# Patient Record
Sex: FEMALE | Race: WHITE | HISPANIC OR LATINO | Employment: FULL TIME | URBAN - METROPOLITAN AREA
[De-identification: names, ages, dates, MRNs, and addresses within clinical notes are randomized per-mention and may not be internally consistent; named-entity substitution may affect disease eponyms.]

---

## 2024-06-05 ENCOUNTER — HOSPITAL ENCOUNTER (OUTPATIENT)
Facility: HOSPITAL | Age: 49
Setting detail: OBSERVATION
Discharge: HOME | End: 2024-06-06
Attending: EMERGENCY MEDICINE | Admitting: HOSPITALIST
Payer: COMMERCIAL

## 2024-06-05 ENCOUNTER — APPOINTMENT (OUTPATIENT)
Dept: CT IMAGING | Facility: HOSPITAL | Age: 49
End: 2024-06-05
Payer: COMMERCIAL

## 2024-06-05 ENCOUNTER — APPOINTMENT (OUTPATIENT)
Dept: GENERAL RADIOLOGY | Facility: HOSPITAL | Age: 49
End: 2024-06-05
Payer: COMMERCIAL

## 2024-06-05 DIAGNOSIS — R11.2 NAUSEA AND VOMITING, UNSPECIFIED VOMITING TYPE: Primary | ICD-10-CM

## 2024-06-05 DIAGNOSIS — E87.29 HIGH ANION GAP METABOLIC ACIDOSIS: ICD-10-CM

## 2024-06-05 DIAGNOSIS — D72.829 LEUKOCYTOSIS, UNSPECIFIED TYPE: ICD-10-CM

## 2024-06-05 PROBLEM — R11.10 VOMITING: Status: ACTIVE | Noted: 2024-06-05

## 2024-06-05 PROBLEM — E10.9 TYPE 1 DIABETES MELLITUS: Status: ACTIVE | Noted: 2024-06-05

## 2024-06-05 LAB
ALBUMIN SERPL-MCNC: 2.9 G/DL (ref 3.5–5.2)
ALBUMIN SERPL-MCNC: 3.7 G/DL (ref 3.5–5.2)
ALBUMIN/GLOB SERPL: 1.5 G/DL
ALBUMIN/GLOB SERPL: 1.5 G/DL
ALP SERPL-CCNC: 57 U/L (ref 39–117)
ALP SERPL-CCNC: 73 U/L (ref 39–117)
ALT SERPL W P-5'-P-CCNC: 20 U/L (ref 1–33)
ALT SERPL W P-5'-P-CCNC: 21 U/L (ref 1–33)
AMPHET+METHAMPHET UR QL: NEGATIVE
ANION GAP SERPL CALCULATED.3IONS-SCNC: 13.3 MMOL/L (ref 5–15)
ANION GAP SERPL CALCULATED.3IONS-SCNC: 15.4 MMOL/L (ref 5–15)
ANION GAP SERPL CALCULATED.3IONS-SCNC: 16 MMOL/L (ref 5–15)
ANION GAP SERPL CALCULATED.3IONS-SCNC: 17 MMOL/L (ref 5–15)
ANION GAP SERPL CALCULATED.3IONS-SCNC: 17.9 MMOL/L (ref 5–15)
ANION GAP SERPL CALCULATED.3IONS-SCNC: 19.9 MMOL/L (ref 5–15)
AST SERPL-CCNC: 18 U/L (ref 1–32)
AST SERPL-CCNC: 21 U/L (ref 1–32)
ATMOSPHERIC PRESS: 743 MMHG
B-OH-BUTYR SERPL-SCNC: 3.73 MMOL/L (ref 0.02–0.27)
BARBITURATES UR QL SCN: NEGATIVE
BASE EXCESS BLDV CALC-SCNC: -13.6 MMOL/L (ref -2–2)
BASOPHILS # BLD AUTO: 0.05 10*3/MM3 (ref 0–0.2)
BASOPHILS # BLD AUTO: 0.07 10*3/MM3 (ref 0–0.2)
BASOPHILS NFR BLD AUTO: 0.2 % (ref 0–1.5)
BASOPHILS NFR BLD AUTO: 0.2 % (ref 0–1.5)
BENZODIAZ UR QL SCN: NEGATIVE
BILIRUB SERPL-MCNC: 0.3 MG/DL (ref 0–1.2)
BILIRUB SERPL-MCNC: 0.4 MG/DL (ref 0–1.2)
BILIRUB UR QL STRIP: NEGATIVE
BUN SERPL-MCNC: 11 MG/DL (ref 6–20)
BUN SERPL-MCNC: 6 MG/DL (ref 6–20)
BUN SERPL-MCNC: 8 MG/DL (ref 6–20)
BUN/CREAT SERPL: 10.4 (ref 7–25)
BUN/CREAT SERPL: 10.7 (ref 7–25)
BUN/CREAT SERPL: 11 (ref 7–25)
BUN/CREAT SERPL: 12.1 (ref 7–25)
BUN/CREAT SERPL: 15.7 (ref 7–25)
BUN/CREAT SERPL: 7.7 (ref 7–25)
CALCIUM SPEC-SCNC: 5.7 MG/DL (ref 8.6–10.5)
CALCIUM SPEC-SCNC: 7.4 MG/DL (ref 8.6–10.5)
CALCIUM SPEC-SCNC: 8 MG/DL (ref 8.6–10.5)
CALCIUM SPEC-SCNC: 8.2 MG/DL (ref 8.6–10.5)
CALCIUM SPEC-SCNC: 8.2 MG/DL (ref 8.6–10.5)
CALCIUM SPEC-SCNC: 8.8 MG/DL (ref 8.6–10.5)
CANNABINOIDS SERPL QL: NEGATIVE
CHLORIDE SERPL-SCNC: 106 MMOL/L (ref 98–107)
CHLORIDE SERPL-SCNC: 107 MMOL/L (ref 98–107)
CHLORIDE SERPL-SCNC: 109 MMOL/L (ref 98–107)
CHLORIDE SERPL-SCNC: 121 MMOL/L (ref 98–107)
CLARITY UR: CLEAR
CO2 BLDA-SCNC: 14.1 MMOL/L (ref 23–27)
CO2 SERPL-SCNC: 11.1 MMOL/L (ref 22–29)
CO2 SERPL-SCNC: 11.7 MMOL/L (ref 22–29)
CO2 SERPL-SCNC: 12 MMOL/L (ref 22–29)
CO2 SERPL-SCNC: 12.1 MMOL/L (ref 22–29)
CO2 SERPL-SCNC: 16 MMOL/L (ref 22–29)
CO2 SERPL-SCNC: 7.6 MMOL/L (ref 22–29)
COCAINE UR QL: NEGATIVE
COLOR UR: YELLOW
CREAT SERPL-MCNC: 0.51 MG/DL (ref 0.57–1)
CREAT SERPL-MCNC: 0.73 MG/DL (ref 0.57–1)
CREAT SERPL-MCNC: 0.75 MG/DL (ref 0.57–1)
CREAT SERPL-MCNC: 0.77 MG/DL (ref 0.57–1)
CREAT SERPL-MCNC: 0.78 MG/DL (ref 0.57–1)
CREAT SERPL-MCNC: 0.91 MG/DL (ref 0.57–1)
DEPRECATED RDW RBC AUTO: 45.5 FL (ref 37–54)
DEPRECATED RDW RBC AUTO: 46.4 FL (ref 37–54)
DEVICE COMMENT: ABNORMAL
EGFRCR SERPLBLD CKD-EPI 2021: 101.6 ML/MIN/1.73
EGFRCR SERPLBLD CKD-EPI 2021: 115.3 ML/MIN/1.73
EGFRCR SERPLBLD CKD-EPI 2021: 78 ML/MIN/1.73
EGFRCR SERPLBLD CKD-EPI 2021: 93.8 ML/MIN/1.73
EGFRCR SERPLBLD CKD-EPI 2021: 95.3 ML/MIN/1.73
EGFRCR SERPLBLD CKD-EPI 2021: 98.3 ML/MIN/1.73
EOSINOPHIL # BLD AUTO: 0 10*3/MM3 (ref 0–0.4)
EOSINOPHIL # BLD AUTO: 0 10*3/MM3 (ref 0–0.4)
EOSINOPHIL NFR BLD AUTO: 0 % (ref 0.3–6.2)
EOSINOPHIL NFR BLD AUTO: 0 % (ref 0.3–6.2)
ERYTHROCYTE [DISTWIDTH] IN BLOOD BY AUTOMATED COUNT: 12.6 % (ref 12.3–15.4)
ERYTHROCYTE [DISTWIDTH] IN BLOOD BY AUTOMATED COUNT: 12.9 % (ref 12.3–15.4)
FENTANYL UR-MCNC: NEGATIVE NG/ML
GEN 5 2HR TROPONIN T REFLEX: 8 NG/L
GLOBULIN UR ELPH-MCNC: 2 GM/DL
GLOBULIN UR ELPH-MCNC: 2.5 GM/DL
GLUCOSE BLDC GLUCOMTR-MCNC: 148 MG/DL (ref 70–130)
GLUCOSE BLDC GLUCOMTR-MCNC: 159 MG/DL (ref 70–130)
GLUCOSE BLDC GLUCOMTR-MCNC: 160 MG/DL (ref 70–130)
GLUCOSE BLDC GLUCOMTR-MCNC: 172 MG/DL (ref 70–130)
GLUCOSE BLDC GLUCOMTR-MCNC: 178 MG/DL (ref 70–130)
GLUCOSE BLDC GLUCOMTR-MCNC: 180 MG/DL (ref 70–130)
GLUCOSE BLDC GLUCOMTR-MCNC: 188 MG/DL (ref 70–130)
GLUCOSE BLDC GLUCOMTR-MCNC: 191 MG/DL (ref 70–130)
GLUCOSE BLDC GLUCOMTR-MCNC: 196 MG/DL (ref 70–130)
GLUCOSE SERPL-MCNC: 152 MG/DL (ref 65–99)
GLUCOSE SERPL-MCNC: 181 MG/DL (ref 65–99)
GLUCOSE SERPL-MCNC: 188 MG/DL (ref 65–99)
GLUCOSE SERPL-MCNC: 188 MG/DL (ref 65–99)
GLUCOSE SERPL-MCNC: 208 MG/DL (ref 65–99)
GLUCOSE SERPL-MCNC: 214 MG/DL (ref 65–99)
GLUCOSE UR STRIP-MCNC: ABNORMAL MG/DL
HBA1C MFR BLD: 7 % (ref 4.8–5.6)
HCG SERPL QL: NEGATIVE
HCO3 BLDV-SCNC: 13.1 MMOL/L (ref 22–28)
HCT VFR BLD AUTO: 42 % (ref 34–46.6)
HCT VFR BLD AUTO: 45.5 % (ref 34–46.6)
HCT VFR BLDA CALC: 45 % (ref 38–51)
HGB BLD-MCNC: 13.3 G/DL (ref 12–15.9)
HGB BLD-MCNC: 14.5 G/DL (ref 12–15.9)
HGB BLDA-MCNC: 15.5 G/DL (ref 12–17)
HGB UR QL STRIP.AUTO: NEGATIVE
HOLD SPECIMEN: NORMAL
IMM GRANULOCYTES # BLD AUTO: 0.14 10*3/MM3 (ref 0–0.05)
IMM GRANULOCYTES # BLD AUTO: 0.26 10*3/MM3 (ref 0–0.05)
IMM GRANULOCYTES NFR BLD AUTO: 0.6 % (ref 0–0.5)
IMM GRANULOCYTES NFR BLD AUTO: 0.9 % (ref 0–0.5)
KETONES UR QL STRIP: ABNORMAL
LEUKOCYTE ESTERASE UR QL STRIP.AUTO: NEGATIVE
LYMPHOCYTES # BLD AUTO: 0.6 10*3/MM3 (ref 0.7–3.1)
LYMPHOCYTES # BLD AUTO: 2.17 10*3/MM3 (ref 0.7–3.1)
LYMPHOCYTES NFR BLD AUTO: 2.1 % (ref 19.6–45.3)
LYMPHOCYTES NFR BLD AUTO: 8.8 % (ref 19.6–45.3)
MAGNESIUM SERPL-MCNC: 1.7 MG/DL (ref 1.6–2.6)
MAGNESIUM SERPL-MCNC: 1.8 MG/DL (ref 1.6–2.6)
MAGNESIUM SERPL-MCNC: 1.8 MG/DL (ref 1.6–2.6)
MAGNESIUM SERPL-MCNC: 2.2 MG/DL (ref 1.6–2.6)
MCH RBC QN AUTO: 31.1 PG (ref 26.6–33)
MCH RBC QN AUTO: 31.3 PG (ref 26.6–33)
MCHC RBC AUTO-ENTMCNC: 31.7 G/DL (ref 31.5–35.7)
MCHC RBC AUTO-ENTMCNC: 31.9 G/DL (ref 31.5–35.7)
MCV RBC AUTO: 98.1 FL (ref 79–97)
MCV RBC AUTO: 98.4 FL (ref 79–97)
METHADONE UR QL SCN: NEGATIVE
MODALITY: ABNORMAL
MONOCYTES # BLD AUTO: 0.48 10*3/MM3 (ref 0.1–0.9)
MONOCYTES # BLD AUTO: 1.08 10*3/MM3 (ref 0.1–0.9)
MONOCYTES NFR BLD AUTO: 1.7 % (ref 5–12)
MONOCYTES NFR BLD AUTO: 4.4 % (ref 5–12)
NEUTROPHILS NFR BLD AUTO: 21.32 10*3/MM3 (ref 1.7–7)
NEUTROPHILS NFR BLD AUTO: 26.96 10*3/MM3 (ref 1.7–7)
NEUTROPHILS NFR BLD AUTO: 86 % (ref 42.7–76)
NEUTROPHILS NFR BLD AUTO: 95.1 % (ref 42.7–76)
NITRITE UR QL STRIP: NEGATIVE
NOTIFIED WHO: ABNORMAL
NRBC BLD AUTO-RTO: 0 /100 WBC (ref 0–0.2)
NRBC BLD AUTO-RTO: 0 /100 WBC (ref 0–0.2)
OPIATES UR QL: NEGATIVE
OSMOLALITY SERPL: 295 MOSM/KG (ref 275–300)
OXYCODONE UR QL SCN: NEGATIVE
PCO2 BLDV: 32.6 MM HG (ref 41–51)
PH BLDV: 7.21 PH UNITS (ref 7.31–7.41)
PH UR STRIP.AUTO: <=5 [PH] (ref 5–8)
PHOSPHATE SERPL-MCNC: 1.4 MG/DL (ref 2.5–4.5)
PHOSPHATE SERPL-MCNC: 2.5 MG/DL (ref 2.5–4.5)
PHOSPHATE SERPL-MCNC: 2.6 MG/DL (ref 2.5–4.5)
PLATELET # BLD AUTO: 314 10*3/MM3 (ref 140–450)
PLATELET # BLD AUTO: 355 10*3/MM3 (ref 140–450)
PMV BLD AUTO: 9.3 FL (ref 6–12)
PMV BLD AUTO: 9.4 FL (ref 6–12)
PO2 BLDV: 36.5 MM HG (ref 35–45)
POTASSIUM SERPL-SCNC: 3.1 MMOL/L (ref 3.5–5.2)
POTASSIUM SERPL-SCNC: 4 MMOL/L (ref 3.5–5.2)
POTASSIUM SERPL-SCNC: 4.1 MMOL/L (ref 3.5–5.2)
POTASSIUM SERPL-SCNC: 4.6 MMOL/L (ref 3.5–5.2)
PROT SERPL-MCNC: 4.9 G/DL (ref 6–8.5)
PROT SERPL-MCNC: 6.2 G/DL (ref 6–8.5)
PROT UR QL STRIP: ABNORMAL
QT INTERVAL: 349 MS
QTC INTERVAL: 444 MS
RBC # BLD AUTO: 4.27 10*6/MM3 (ref 3.77–5.28)
RBC # BLD AUTO: 4.64 10*6/MM3 (ref 3.77–5.28)
SAO2 % BLDCOV: 58.9 % (ref 45–75)
SODIUM SERPL-SCNC: 134 MMOL/L (ref 136–145)
SODIUM SERPL-SCNC: 136 MMOL/L (ref 136–145)
SODIUM SERPL-SCNC: 137 MMOL/L (ref 136–145)
SODIUM SERPL-SCNC: 138 MMOL/L (ref 136–145)
SODIUM SERPL-SCNC: 138 MMOL/L (ref 136–145)
SODIUM SERPL-SCNC: 144 MMOL/L (ref 136–145)
SP GR UR STRIP: >1.03 (ref 1–1.03)
TROPONIN T DELTA: 2 NG/L
TROPONIN T SERPL HS-MCNC: 6 NG/L
UROBILINOGEN UR QL STRIP: ABNORMAL
WBC NRBC COR # BLD AUTO: 24.76 10*3/MM3 (ref 3.4–10.8)
WBC NRBC COR # BLD AUTO: 28.37 10*3/MM3 (ref 3.4–10.8)
WHOLE BLOOD HOLD COAG: NORMAL
WHOLE BLOOD HOLD SPECIMEN: NORMAL

## 2024-06-05 PROCEDURE — 93010 ELECTROCARDIOGRAM REPORT: CPT | Performed by: INTERNAL MEDICINE

## 2024-06-05 PROCEDURE — 99285 EMERGENCY DEPT VISIT HI MDM: CPT

## 2024-06-05 PROCEDURE — 80307 DRUG TEST PRSMV CHEM ANLYZR: CPT | Performed by: EMERGENCY MEDICINE

## 2024-06-05 PROCEDURE — 83735 ASSAY OF MAGNESIUM: CPT | Performed by: INTERNAL MEDICINE

## 2024-06-05 PROCEDURE — 85025 COMPLETE CBC W/AUTO DIFF WBC: CPT | Performed by: NURSE PRACTITIONER

## 2024-06-05 PROCEDURE — 96366 THER/PROPH/DIAG IV INF ADDON: CPT

## 2024-06-05 PROCEDURE — 25010000002 ONDANSETRON PER 1 MG: Performed by: NURSE PRACTITIONER

## 2024-06-05 PROCEDURE — 36415 COLL VENOUS BLD VENIPUNCTURE: CPT | Performed by: HOSPITALIST

## 2024-06-05 PROCEDURE — G0378 HOSPITAL OBSERVATION PER HR: HCPCS

## 2024-06-05 PROCEDURE — 96365 THER/PROPH/DIAG IV INF INIT: CPT

## 2024-06-05 PROCEDURE — 83036 HEMOGLOBIN GLYCOSYLATED A1C: CPT | Performed by: INTERNAL MEDICINE

## 2024-06-05 PROCEDURE — 81003 URINALYSIS AUTO W/O SCOPE: CPT | Performed by: NURSE PRACTITIONER

## 2024-06-05 PROCEDURE — 82010 KETONE BODYS QUAN: CPT | Performed by: NURSE PRACTITIONER

## 2024-06-05 PROCEDURE — 84703 CHORIONIC GONADOTROPIN ASSAY: CPT | Performed by: NURSE PRACTITIONER

## 2024-06-05 PROCEDURE — 74177 CT ABD & PELVIS W/CONTRAST: CPT

## 2024-06-05 PROCEDURE — 83735 ASSAY OF MAGNESIUM: CPT | Performed by: HOSPITALIST

## 2024-06-05 PROCEDURE — 25810000003 SODIUM CHLORIDE 0.9 % SOLUTION: Performed by: NURSE PRACTITIONER

## 2024-06-05 PROCEDURE — 85018 HEMOGLOBIN: CPT

## 2024-06-05 PROCEDURE — 82803 BLOOD GASES ANY COMBINATION: CPT

## 2024-06-05 PROCEDURE — 96376 TX/PRO/DX INJ SAME DRUG ADON: CPT

## 2024-06-05 PROCEDURE — 84484 ASSAY OF TROPONIN QUANT: CPT | Performed by: INTERNAL MEDICINE

## 2024-06-05 PROCEDURE — 25810000003 LACTATED RINGERS SOLUTION: Performed by: EMERGENCY MEDICINE

## 2024-06-05 PROCEDURE — 82948 REAGENT STRIP/BLOOD GLUCOSE: CPT

## 2024-06-05 PROCEDURE — 25510000001 IOPAMIDOL 61 % SOLUTION: Performed by: EMERGENCY MEDICINE

## 2024-06-05 PROCEDURE — 85025 COMPLETE CBC W/AUTO DIFF WBC: CPT | Performed by: INTERNAL MEDICINE

## 2024-06-05 PROCEDURE — 25010000002 ONDANSETRON PER 1 MG: Performed by: HOSPITALIST

## 2024-06-05 PROCEDURE — 93005 ELECTROCARDIOGRAM TRACING: CPT | Performed by: EMERGENCY MEDICINE

## 2024-06-05 PROCEDURE — 80053 COMPREHEN METABOLIC PANEL: CPT | Performed by: EMERGENCY MEDICINE

## 2024-06-05 PROCEDURE — 96375 TX/PRO/DX INJ NEW DRUG ADDON: CPT

## 2024-06-05 PROCEDURE — 84100 ASSAY OF PHOSPHORUS: CPT | Performed by: INTERNAL MEDICINE

## 2024-06-05 PROCEDURE — 83930 ASSAY OF BLOOD OSMOLALITY: CPT | Performed by: INTERNAL MEDICINE

## 2024-06-05 PROCEDURE — 71045 X-RAY EXAM CHEST 1 VIEW: CPT

## 2024-06-05 RX ORDER — SODIUM CHLORIDE 9 MG/ML
250 INJECTION, SOLUTION INTRAVENOUS CONTINUOUS PRN
Status: DISCONTINUED | OUTPATIENT
Start: 2024-06-05 | End: 2024-06-06 | Stop reason: HOSPADM

## 2024-06-05 RX ORDER — SODIUM CHLORIDE 9 MG/ML
150 INJECTION, SOLUTION INTRAVENOUS CONTINUOUS
Status: DISCONTINUED | OUTPATIENT
Start: 2024-06-05 | End: 2024-06-05

## 2024-06-05 RX ORDER — INSULIN LISPRO 100 [IU]/ML
2-9 INJECTION, SOLUTION INTRAVENOUS; SUBCUTANEOUS
Status: DISCONTINUED | OUTPATIENT
Start: 2024-06-05 | End: 2024-06-05

## 2024-06-05 RX ORDER — SODIUM CHLORIDE 9 MG/ML
40 INJECTION, SOLUTION INTRAVENOUS AS NEEDED
Status: DISCONTINUED | OUTPATIENT
Start: 2024-06-05 | End: 2024-06-06 | Stop reason: HOSPADM

## 2024-06-05 RX ORDER — NITROFURANTOIN 25; 75 MG/1; MG/1
CAPSULE ORAL
Status: ON HOLD | COMMUNITY
Start: 2024-03-26 | End: 2024-06-05

## 2024-06-05 RX ORDER — SODIUM CHLORIDE 0.9 % (FLUSH) 0.9 %
10 SYRINGE (ML) INJECTION AS NEEDED
Status: DISCONTINUED | OUTPATIENT
Start: 2024-06-05 | End: 2024-06-06 | Stop reason: HOSPADM

## 2024-06-05 RX ORDER — SODIUM CHLORIDE 0.9 % (FLUSH) 0.9 %
10 SYRINGE (ML) INJECTION EVERY 12 HOURS SCHEDULED
Status: DISCONTINUED | OUTPATIENT
Start: 2024-06-05 | End: 2024-06-06 | Stop reason: HOSPADM

## 2024-06-05 RX ORDER — SODIUM CHLORIDE AND POTASSIUM CHLORIDE 300; 900 MG/100ML; MG/100ML
250 INJECTION, SOLUTION INTRAVENOUS CONTINUOUS PRN
Status: DISCONTINUED | OUTPATIENT
Start: 2024-06-05 | End: 2024-06-06

## 2024-06-05 RX ORDER — DEXTROSE MONOHYDRATE, SODIUM CHLORIDE, AND POTASSIUM CHLORIDE 50; 1.49; 9 G/1000ML; G/1000ML; G/1000ML
150 INJECTION, SOLUTION INTRAVENOUS CONTINUOUS PRN
Status: DISCONTINUED | OUTPATIENT
Start: 2024-06-05 | End: 2024-06-06

## 2024-06-05 RX ORDER — DEXTROSE MONOHYDRATE AND SODIUM CHLORIDE 5; .9 G/100ML; G/100ML
150 INJECTION, SOLUTION INTRAVENOUS CONTINUOUS PRN
Status: DISCONTINUED | OUTPATIENT
Start: 2024-06-05 | End: 2024-06-06

## 2024-06-05 RX ORDER — DEXTROSE MONOHYDRATE, SODIUM CHLORIDE, AND POTASSIUM CHLORIDE 50; 1.49; 4.5 G/1000ML; G/1000ML; G/1000ML
150 INJECTION, SOLUTION INTRAVENOUS CONTINUOUS PRN
Status: DISCONTINUED | OUTPATIENT
Start: 2024-06-05 | End: 2024-06-06

## 2024-06-05 RX ORDER — COLESEVELAM 180 1/1
1875 TABLET ORAL
COMMUNITY
Start: 2024-05-21 | End: 2025-05-21

## 2024-06-05 RX ORDER — IBUPROFEN 600 MG/1
1 TABLET ORAL
Status: DISCONTINUED | OUTPATIENT
Start: 2024-06-05 | End: 2024-06-06 | Stop reason: HOSPADM

## 2024-06-05 RX ORDER — ONDANSETRON 2 MG/ML
4 INJECTION INTRAMUSCULAR; INTRAVENOUS EVERY 6 HOURS PRN
Status: DISCONTINUED | OUTPATIENT
Start: 2024-06-05 | End: 2024-06-06 | Stop reason: HOSPADM

## 2024-06-05 RX ORDER — INSULIN ASPART 100 [IU]/ML
INJECTION, SOLUTION INTRAVENOUS; SUBCUTANEOUS
COMMUNITY
Start: 2024-05-06

## 2024-06-05 RX ORDER — METRONIDAZOLE 500 MG/1
TABLET ORAL
Status: ON HOLD | COMMUNITY
Start: 2024-05-14 | End: 2024-06-05

## 2024-06-05 RX ORDER — NICOTINE POLACRILEX 4 MG
15 LOZENGE BUCCAL
Status: DISCONTINUED | OUTPATIENT
Start: 2024-06-05 | End: 2024-06-06 | Stop reason: HOSPADM

## 2024-06-05 RX ORDER — ACETAMINOPHEN 325 MG/1
650 TABLET ORAL EVERY 4 HOURS PRN
Status: DISCONTINUED | OUTPATIENT
Start: 2024-06-05 | End: 2024-06-06 | Stop reason: HOSPADM

## 2024-06-05 RX ORDER — DEXTROSE MONOHYDRATE 25 G/50ML
25 INJECTION, SOLUTION INTRAVENOUS
Status: DISCONTINUED | OUTPATIENT
Start: 2024-06-05 | End: 2024-06-06 | Stop reason: HOSPADM

## 2024-06-05 RX ORDER — SODIUM CHLORIDE 450 MG/100ML
250 INJECTION, SOLUTION INTRAVENOUS CONTINUOUS PRN
Status: DISCONTINUED | OUTPATIENT
Start: 2024-06-05 | End: 2024-06-06

## 2024-06-05 RX ORDER — SULFAMETHOXAZOLE AND TRIMETHOPRIM 800; 160 MG/1; MG/1
TABLET ORAL
Status: ON HOLD | COMMUNITY
Start: 2024-04-18 | End: 2024-06-05

## 2024-06-05 RX ORDER — DEXTROSE MONOHYDRATE 25 G/50ML
10-50 INJECTION, SOLUTION INTRAVENOUS
Status: DISCONTINUED | OUTPATIENT
Start: 2024-06-05 | End: 2024-06-06 | Stop reason: HOSPADM

## 2024-06-05 RX ORDER — CETIRIZINE HYDROCHLORIDE 10 MG/1
10 TABLET ORAL NIGHTLY
Status: DISCONTINUED | OUTPATIENT
Start: 2024-06-05 | End: 2024-06-06 | Stop reason: HOSPADM

## 2024-06-05 RX ORDER — ONDANSETRON 4 MG/1
4 TABLET, ORALLY DISINTEGRATING ORAL EVERY 6 HOURS PRN
Status: DISCONTINUED | OUTPATIENT
Start: 2024-06-05 | End: 2024-06-06 | Stop reason: HOSPADM

## 2024-06-05 RX ORDER — SODIUM CHLORIDE AND POTASSIUM CHLORIDE 150; 450 MG/100ML; MG/100ML
250 INJECTION, SOLUTION INTRAVENOUS CONTINUOUS PRN
Status: DISCONTINUED | OUTPATIENT
Start: 2024-06-05 | End: 2024-06-06

## 2024-06-05 RX ORDER — CALCIUM GLUCONATE 20 MG/ML
1000 INJECTION, SOLUTION INTRAVENOUS ONCE
Status: DISCONTINUED | OUTPATIENT
Start: 2024-06-05 | End: 2024-06-05

## 2024-06-05 RX ORDER — ONDANSETRON 2 MG/ML
4 INJECTION INTRAMUSCULAR; INTRAVENOUS ONCE
Status: COMPLETED | OUTPATIENT
Start: 2024-06-05 | End: 2024-06-05

## 2024-06-05 RX ORDER — MONTELUKAST SODIUM 10 MG/1
10 TABLET ORAL NIGHTLY
Status: DISCONTINUED | OUTPATIENT
Start: 2024-06-05 | End: 2024-06-06 | Stop reason: HOSPADM

## 2024-06-05 RX ORDER — CETIRIZINE HYDROCHLORIDE 10 MG/1
10 TABLET ORAL NIGHTLY
COMMUNITY

## 2024-06-05 RX ORDER — MONTELUKAST SODIUM 10 MG/1
10 TABLET ORAL NIGHTLY
COMMUNITY

## 2024-06-05 RX ORDER — OLMESARTAN MEDOXOMIL AND HYDROCHLOROTHIAZIDE 40/12.5 40; 12.5 MG/1; MG/1
TABLET ORAL
Status: ON HOLD | COMMUNITY
Start: 2024-04-24 | End: 2024-06-05

## 2024-06-05 RX ORDER — SODIUM CHLORIDE AND POTASSIUM CHLORIDE 150; 900 MG/100ML; MG/100ML
250 INJECTION, SOLUTION INTRAVENOUS CONTINUOUS PRN
Status: DISCONTINUED | OUTPATIENT
Start: 2024-06-05 | End: 2024-06-06

## 2024-06-05 RX ORDER — FLUCONAZOLE 150 MG/1
TABLET ORAL
Status: ON HOLD | COMMUNITY
Start: 2024-05-15 | End: 2024-06-05

## 2024-06-05 RX ORDER — SEMAGLUTIDE 1.34 MG/ML
INJECTION, SOLUTION SUBCUTANEOUS
COMMUNITY
Start: 2024-04-23 | End: 2024-06-06 | Stop reason: HOSPADM

## 2024-06-05 RX ORDER — DEXTROSE MONOHYDRATE, SODIUM CHLORIDE, AND POTASSIUM CHLORIDE 50; 2.98; 9 G/1000ML; G/1000ML; G/1000ML
150 INJECTION, SOLUTION INTRAVENOUS CONTINUOUS PRN
Status: DISCONTINUED | OUTPATIENT
Start: 2024-06-05 | End: 2024-06-06

## 2024-06-05 RX ORDER — OLMESARTAN MEDOXOMIL AND HYDROCHLOROTHIAZIDE 20/12.5 20; 12.5 MG/1; MG/1
1 TABLET ORAL DAILY
COMMUNITY
Start: 2024-05-21

## 2024-06-05 RX ORDER — DEXTROSE MONOHYDRATE AND SODIUM CHLORIDE 5; .45 G/100ML; G/100ML
150 INJECTION, SOLUTION INTRAVENOUS CONTINUOUS PRN
Status: DISCONTINUED | OUTPATIENT
Start: 2024-06-05 | End: 2024-06-06

## 2024-06-05 RX ORDER — DEXTROSE MONOHYDRATE, SODIUM CHLORIDE, AND POTASSIUM CHLORIDE 50; 2.98; 4.5 G/1000ML; G/1000ML; G/1000ML
150 INJECTION, SOLUTION INTRAVENOUS CONTINUOUS PRN
Status: DISCONTINUED | OUTPATIENT
Start: 2024-06-05 | End: 2024-06-06

## 2024-06-05 RX ORDER — VILAZODONE HYDROCHLORIDE 40 MG/1
40 TABLET ORAL DAILY
Status: ON HOLD | COMMUNITY
Start: 2024-01-29 | End: 2024-06-05

## 2024-06-05 RX ADMIN — Medication 10 ML: at 20:33

## 2024-06-05 RX ADMIN — SODIUM CHLORIDE, POTASSIUM CHLORIDE, SODIUM LACTATE AND CALCIUM CHLORIDE 1000 ML: 600; 310; 30; 20 INJECTION, SOLUTION INTRAVENOUS at 09:51

## 2024-06-05 RX ADMIN — ACETAMINOPHEN 325MG 650 MG: 325 TABLET ORAL at 13:01

## 2024-06-05 RX ADMIN — ONDANSETRON 4 MG: 2 INJECTION INTRAMUSCULAR; INTRAVENOUS at 14:29

## 2024-06-05 RX ADMIN — ONDANSETRON 4 MG: 2 INJECTION INTRAMUSCULAR; INTRAVENOUS at 07:18

## 2024-06-05 RX ADMIN — POTASSIUM CHLORIDE, DEXTROSE MONOHYDRATE AND SODIUM CHLORIDE 150 ML/HR: 150; 5; 450 INJECTION, SOLUTION INTRAVENOUS at 17:38

## 2024-06-05 RX ADMIN — Medication 10 ML: at 12:43

## 2024-06-05 RX ADMIN — INSULIN HUMAN 1.1 UNITS/HR: 1 INJECTION, SOLUTION INTRAVENOUS at 17:18

## 2024-06-05 RX ADMIN — SODIUM CHLORIDE 150 ML/HR: 9 INJECTION, SOLUTION INTRAVENOUS at 13:27

## 2024-06-05 RX ADMIN — IOPAMIDOL 85 ML: 612 INJECTION, SOLUTION INTRAVENOUS at 08:55

## 2024-06-05 RX ADMIN — SODIUM CHLORIDE 1000 ML: 9 INJECTION, SOLUTION INTRAVENOUS at 07:18

## 2024-06-05 RX ADMIN — ACETAMINOPHEN 325MG 650 MG: 325 TABLET ORAL at 20:49

## 2024-06-05 RX ADMIN — MONTELUKAST SODIUM 10 MG: 10 TABLET, FILM COATED ORAL at 22:20

## 2024-06-05 RX ADMIN — CETIRIZINE HYDROCHLORIDE 10 MG: 10 TABLET ORAL at 20:38

## 2024-06-05 NOTE — NURSING NOTE
1606; second attempt to call report, number left with staff  Attempt to call report, will call back.

## 2024-06-05 NOTE — ED PROVIDER NOTES
MD ATTESTATION NOTE    SHARED VISIT: This visit was performed by BOTH a physician and an APC. The substantive portion of the medical decision making was performed by this attesting physician who made or approved the management plan and takes responsibility for patient management. All studies documented in the APC note (if performed) were independently interpreted by me.    The ROMAN and I have discussed this patient's history, physical exam, MDM, and treatment plan.  I have reviewed the documentation and personally had a face to face interaction with the patient. The attached note describes my personal findings.      Elena Sherwood is a 49 y.o. female who presents to the ED c/o acute vomiting for the past 2 days.  She states that she was vomiting every 2 hours.  Now for the past 12 hours, she been throwing up about every 30 minutes.  Her review of systems otherwise negative.  No fever, cough, shortness of breath, abdominal pain, diarrhea.  She states that she has been managing her blood sugars which have been up and down a lot over the past few days with sliding scale insulin.    On exam:  GENERAL: not distressed  HENT: nares patent  EYES: no scleral icterus  CV: regular rhythm, regular rate  RESPIRATORY: normal effort  ABDOMEN: soft, nontender  MUSCULOSKELETAL: no deformity  NEURO: alert, moves all extremities, follows commands  SKIN: warm, dry    Labs  Recent Results (from the past 24 hour(s))   hCG, Serum, Qualitative    Collection Time: 06/05/24  7:17 AM    Specimen: Blood   Result Value Ref Range    HCG Qualitative Negative Negative   CBC Auto Differential    Collection Time: 06/05/24  7:17 AM    Specimen: Blood   Result Value Ref Range    WBC 28.37 (H) 3.40 - 10.80 10*3/mm3    RBC 4.64 3.77 - 5.28 10*6/mm3    Hemoglobin 14.5 12.0 - 15.9 g/dL    Hematocrit 45.5 34.0 - 46.6 %    MCV 98.1 (H) 79.0 - 97.0 fL    MCH 31.3 26.6 - 33.0 pg    MCHC 31.9 31.5 - 35.7 g/dL    RDW 12.6 12.3 - 15.4 %    RDW-SD 45.5 37.0 -  54.0 fl    MPV 9.3 6.0 - 12.0 fL    Platelets 355 140 - 450 10*3/mm3    Neutrophil % 95.1 (H) 42.7 - 76.0 %    Lymphocyte % 2.1 (L) 19.6 - 45.3 %    Monocyte % 1.7 (L) 5.0 - 12.0 %    Eosinophil % 0.0 (L) 0.3 - 6.2 %    Basophil % 0.2 0.0 - 1.5 %    Immature Grans % 0.9 (H) 0.0 - 0.5 %    Neutrophils, Absolute 26.96 (H) 1.70 - 7.00 10*3/mm3    Lymphocytes, Absolute 0.60 (L) 0.70 - 3.10 10*3/mm3    Monocytes, Absolute 0.48 0.10 - 0.90 10*3/mm3    Eosinophils, Absolute 0.00 0.00 - 0.40 10*3/mm3    Basophils, Absolute 0.07 0.00 - 0.20 10*3/mm3    Immature Grans, Absolute 0.26 (H) 0.00 - 0.05 10*3/mm3    nRBC 0.0 0.0 - 0.2 /100 WBC   Green Top (Gel)    Collection Time: 06/05/24  7:17 AM   Result Value Ref Range    Extra Tube Hold for add-ons.    Lavender Top    Collection Time: 06/05/24  7:17 AM   Result Value Ref Range    Extra Tube hold for add-on    Light Blue Top    Collection Time: 06/05/24  7:17 AM   Result Value Ref Range    Extra Tube Hold for add-ons.        Radiology  No Radiology Exams Resulted Within Past 24 Hours    Medications given in the ED:  Medications   sodium chloride 0.9 % flush 10 mL (has no administration in time range)   sodium chloride 0.9 % bolus 1,000 mL (1,000 mL Intravenous New Bag 6/5/24 0718)   ondansetron (ZOFRAN) injection 4 mg (4 mg Intravenous Given 6/5/24 0718)       Orders placed during this visit:  Orders Placed This Encounter   Procedures    Comprehensive Metabolic Panel    hCG, Serum, Qualitative    Urinalysis With Microscopic If Indicated (No Culture) - Urine, Clean Catch    [place] Draw    CBC Auto Differential    Beta Hydroxybutyrate Quantitative    Blood Gas, Venous -    POC Glucose Once    Insert Peripheral IV    CBC & Differential    Ketone Bodies, Serum (Not performed at [facility])    Green Top (Gel)    Lavender Top    Light Blue Top       Medical Decision Making:  ED Course as of 06/05/24 1025   Wed Jun 05, 2024   0733 WBC(!): 28.37 [JS]   0749 HCG Qualitative: Negative  [TD]   0814 CO2(!!): 7.6 [JS]   0838 CO2(!!): 7.6 [TD]   0839 Calcium(!!): 5.7 [TD]   0839 Anion Gap(!): 15.4 [TD]   0839 Glucose(!): 208 [TD]   0853 EKG independently interpreted by myself.  Time 8:50 AM.  Sinus rhythm.  Heart rate 97.  Left atrial normality.  No acute ST abnormality. [TD]   1002 Chest x-ray independently interpreted by myself.  No evidence of pneumonia. [TD]   1002 CO2(!): 16.0 [TD]   1002 Calcium: 8.8 [TD]   1006 I see no clear explanation for the patient's leukocytosis at this point.  Chest x-ray and CT scan are unrevealing.  I am still waiting on a urinalysis to result.  However, I do wonder if she had a case of DKA that is now resolving given the mild anion gap acidosis and if this with her vomiting is the cause of her leukocytosis.  As such, I think it is reasonable to hold off on antibiotics right now to see how her her symptoms progress and if her white count closes.  She is systemically well-appearing at this point. [TD]   1019 I discussed the case with Dr. Kam, hospitalist.  We reviewed the patient's labs, history, imaging.  At this point, we both agree that is reasonable to hold off on antibiotics at this time.  Will continue to monitor the patient's clinical progression. [TD]      ED Course User Index  [JS] Natasha Bradford APRN  [TD] Nas Sharma II, MD       Differential diagnosis:  DKA, infectious process versus appendicitis or pneumonia, stress demargination from recurrent vomiting    Diagnosis  Final diagnoses:   Nausea and vomiting, unspecified vomiting type   High anion gap metabolic acidosis   Leukocytosis, unspecified type     Admit       Nas Sharma II, MD  06/05/24 6742

## 2024-06-05 NOTE — ED PROVIDER NOTES
" EMERGENCY DEPARTMENT ENCOUNTER  Room Number:  14/14  PCP: Beatrice Valencia APRN  Independent Historians: Patient      HPI:  Chief Complaint: had concerns including Vomiting.     A complete HPI/ROS/PMH/PSH/SH/FH are unobtainable due to: None    Chronic or social conditions impacting patient care (Social Determinants of Health): None      Context: Elena GRANDE is a 49 y.o. female with a history of Type 1 diabetes who presents to the ED c/o acute nausea and vomiting onset 2 days prior. Reports multiple episodes of vomiting. Now dry heaving. Unable to hold down any fluids. No abdominal pain with the exception of some discomfort when she vomits. No fever. No cough or cold symptoms. No dysuria. She does report a mild headache. Her blood sugar has been \"up and down\" over the past few days.  just pta. She took 15 units of regular insulin. Denies missing any doses of her medication or any new changes to her medication regimen.   No prior history of DKA.     PAST MEDICAL HISTORY  Active Ambulatory Problems     Diagnosis Date Noted    No Active Ambulatory Problems     Resolved Ambulatory Problems     Diagnosis Date Noted    No Resolved Ambulatory Problems     Past Medical History:   Diagnosis Date    Diabetes mellitus          PAST SURGICAL HISTORY  History reviewed. No pertinent surgical history.      FAMILY HISTORY  History reviewed. No pertinent family history.      SOCIAL HISTORY  Social History     Socioeconomic History    Marital status:          ALLERGIES  Patient has no known allergies.      REVIEW OF SYSTEMS  Review of Systems  Included in HPI  All systems reviewed and negative except for those discussed in HPI.      PHYSICAL EXAM    I have reviewed the triage vital signs and nursing notes.    ED Triage Vitals   Temp Heart Rate Resp BP SpO2   06/05/24 0651 06/05/24 0651 06/05/24 0651 06/05/24 0652 06/05/24 0651   98.1 °F (36.7 °C) 110 16 121/68 99 %      Temp src Heart Rate Source Patient Position BP " Location FiO2 (%)   -- -- -- -- --              Physical Exam  GENERAL: Awake alert, no acute distress  SKIN: Warm, dry  HENT: Normocephalic, atraumatic. MM dry   EYES: no scleral icterus  CV: regular rhythm, regular rate  RESPIRATORY: normal effort, lungs clear  ABDOMEN: soft, nontender, nondistended  MUSCULOSKELETAL: no deformity  NEURO: alert, moves all extremities, follows commands        LAB RESULTS  Recent Results (from the past 24 hour(s))   Comprehensive Metabolic Panel    Collection Time: 06/05/24  7:17 AM    Specimen: Blood   Result Value Ref Range    Glucose 208 (H) 65 - 99 mg/dL    BUN 8 6 - 20 mg/dL    Creatinine 0.51 (L) 0.57 - 1.00 mg/dL    Sodium 144 136 - 145 mmol/L    Potassium 3.1 (L) 3.5 - 5.2 mmol/L    Chloride 121 (H) 98 - 107 mmol/L    CO2 7.6 (C) 22.0 - 29.0 mmol/L    Calcium 5.7 (C) 8.6 - 10.5 mg/dL    Total Protein 4.9 (L) 6.0 - 8.5 g/dL    Albumin 2.9 (L) 3.5 - 5.2 g/dL    ALT (SGPT) 20 1 - 33 U/L    AST (SGOT) 21 1 - 32 U/L    Alkaline Phosphatase 57 39 - 117 U/L    Total Bilirubin 0.3 0.0 - 1.2 mg/dL    Globulin 2.0 gm/dL    A/G Ratio 1.5 g/dL    BUN/Creatinine Ratio 15.7 7.0 - 25.0    Anion Gap 15.4 (H) 5.0 - 15.0 mmol/L    eGFR 115.3 >60.0 mL/min/1.73   hCG, Serum, Qualitative    Collection Time: 06/05/24  7:17 AM    Specimen: Blood   Result Value Ref Range    HCG Qualitative Negative Negative   CBC Auto Differential    Collection Time: 06/05/24  7:17 AM    Specimen: Blood   Result Value Ref Range    WBC 28.37 (H) 3.40 - 10.80 10*3/mm3    RBC 4.64 3.77 - 5.28 10*6/mm3    Hemoglobin 14.5 12.0 - 15.9 g/dL    Hematocrit 45.5 34.0 - 46.6 %    MCV 98.1 (H) 79.0 - 97.0 fL    MCH 31.3 26.6 - 33.0 pg    MCHC 31.9 31.5 - 35.7 g/dL    RDW 12.6 12.3 - 15.4 %    RDW-SD 45.5 37.0 - 54.0 fl    MPV 9.3 6.0 - 12.0 fL    Platelets 355 140 - 450 10*3/mm3    Neutrophil % 95.1 (H) 42.7 - 76.0 %    Lymphocyte % 2.1 (L) 19.6 - 45.3 %    Monocyte % 1.7 (L) 5.0 - 12.0 %    Eosinophil % 0.0 (L) 0.3 - 6.2 %     Basophil % 0.2 0.0 - 1.5 %    Immature Grans % 0.9 (H) 0.0 - 0.5 %    Neutrophils, Absolute 26.96 (H) 1.70 - 7.00 10*3/mm3    Lymphocytes, Absolute 0.60 (L) 0.70 - 3.10 10*3/mm3    Monocytes, Absolute 0.48 0.10 - 0.90 10*3/mm3    Eosinophils, Absolute 0.00 0.00 - 0.40 10*3/mm3    Basophils, Absolute 0.07 0.00 - 0.20 10*3/mm3    Immature Grans, Absolute 0.26 (H) 0.00 - 0.05 10*3/mm3    nRBC 0.0 0.0 - 0.2 /100 WBC   Beta Hydroxybutyrate Quantitative    Collection Time: 06/05/24  7:17 AM    Specimen: Blood   Result Value Ref Range    Beta-Hydroxybutyrate Quant 3.733 (H) 0.020 - 0.270 mmol/L   Green Top (Gel)    Collection Time: 06/05/24  7:17 AM   Result Value Ref Range    Extra Tube Hold for add-ons.    Lavender Top    Collection Time: 06/05/24  7:17 AM   Result Value Ref Range    Extra Tube hold for add-on    Light Blue Top    Collection Time: 06/05/24  7:17 AM   Result Value Ref Range    Extra Tube Hold for add-ons.    ECG 12 Lead Electrolyte Imbalance    Collection Time: 06/05/24  8:50 AM   Result Value Ref Range    QT Interval 349 ms    QTC Interval 444 ms   POC H&H    Collection Time: 06/05/24  8:55 AM    Specimen: Venous Blood   Result Value Ref Range    Hemoglobin 15.5 12.0 - 17.0 g/dL    Hematocrit 45 38 - 51 %   Blood Gas, Venous -    Collection Time: 06/05/24  8:55 AM    Specimen: Venous Blood   Result Value Ref Range    pH, Venous 7.211 (C) 7.310 - 7.410 pH Units    pCO2, Venous 32.6 (L) 41.0 - 51.0 mm Hg    pO2, Venous 36.5 35.0 - 45.0 mm Hg    HCO3, Venous 13.1 (L) 22.0 - 28.0 mmol/L    Base Excess, Venous -13.6 (L) -2.0 - 2.0 mmol/L    O2 Saturation, Venous 58.9 45.0 - 75.0 %    CO2 Content 14.1 (L) 23 - 27 mmol/L    Barometric Pressure for Blood Gas 743.0000 mmHg    Modality Room Air     Notified Who md [email or web address]     Device Comment drawn by [id number] at 08:43    Basic Metabolic Panel    Collection Time: 06/05/24  9:25 AM    Specimen: Blood   Result Value Ref Range    Glucose 152 (H) 65 - 99 mg/dL    BUN 11 6 -  20 mg/dL    Creatinine 0.91 0.57 - 1.00 mg/dL    Sodium 138 136 - 145 mmol/L    Potassium 4.6 3.5 - 5.2 mmol/L    Chloride 106 98 - 107 mmol/L    CO2 16.0 (L) 22.0 - 29.0 mmol/L    Calcium 8.8 8.6 - 10.5 mg/dL    BUN/Creatinine Ratio 12.1 7.0 - 25.0    Anion Gap 16.0 (H) 5.0 - 15.0 mmol/L    eGFR 78.0 >60.0 mL/min/1.73   Urinalysis With Microscopic If Indicated (No Culture) - Urine, Clean Catch    Collection Time: 06/05/24 10:35 AM    Specimen: Urine, Clean Catch   Result Value Ref Range    Color, UA Yellow Yellow, Straw    Appearance, UA Clear Clear    pH, UA <=5.0 5.0 - 8.0    Specific Gravity, UA >1.030 (H) 1.005 - 1.030    Glucose, UA >=1000 mg/dL (3+) (A) Negative    Ketones, UA >=160 mg/dL (4+) (A) Negative    Bilirubin, UA Negative Negative    Blood, UA Negative Negative    Protein, UA Trace (A) Negative    Leuk Esterase, UA Negative Negative    Nitrite, UA Negative Negative    Urobilinogen, UA 0.2 E.U./dL 0.2 - 1.0 E.U./dL         RADIOLOGY  CT Abdomen Pelvis With Contrast    Result Date: 6/5/2024  CT ABDOMEN PELVIS W CONTRAST-  HISTORY: 49 years of age, Female.  vomiting, unexplained leukocytosis; E87.20-Acidosis, unspecified; E83.51-Hypocalcemia; R11.2-Nausea with vomiting, unspecified  TECHNIQUE:  CT includes axial imaging from the lung bases to the trochanters with intravenous contrast and without use of oral contrast. Data reconstructed in coronal and sagittal planes. Radiation dose reduction techniques were utilized, including automated exposure control and exposure modulation based on body size.  COMPARISON: None.  FINDINGS: Lung bases appear clear and there is no basilar effusion.  Liver, spleen, adrenal glands, pancreas, kidneys appear within normal limits. No hydronephrosis. Mild urinary bladder distention without wall thickening.  There is no bowel dilatation or evidence for bowel obstruction. There is mild stranding within the mesentery particularly within the right central and lateral abdomen  though there is no evidence for abscess formation. There is a paucity of intra-abdominal fat limiting evaluation for focal inflammatory process. No evidence to suggest appendicitis. There is a cyst along the right lateral margin of the dome of the urinary bladder. This measures approximately 3.5 x 2.1 x 2.2 cm and is consistent with an ovarian cyst. IUD is present. Mild atherosclerotic calcifications are present valve in the abdominal aorta. No radhika enlargement is present within the abdomen or pelvis.      1. Mild generalized stranding within the mesentery is not specific. No evidence to suggest appendicitis or bowel obstruction. No abscess formation. Recommend follow-up CT if patient's symptoms persist or worsen. 2. 3.5 cm right adnexal cyst just lateral to the urinary bladder dome. 3. IUD is present.   Radiation dose reduction techniques were utilized, including automated exposure control and exposure modulation based on body size.   This report was finalized on 6/5/2024 10:50 AM by Dr. Ja Saenz M.D on Workstation: [id number]      XR Chest 1 View    Result Date: 6/5/2024  XR CHEST 1 VW-  HISTORY: Female who is 49 years-old, vomiting  TECHNIQUE: Frontal views of the chest  COMPARISON: None available  FINDINGS: Heart, mediastinum and pulmonary vasculature are unremarkable. No focal pulmonary consolidation, pleural effusion, or pneumothorax. No acute osseous process.      No focal pulmonary consolidation. Follow-up as clinical indications persist.  This report was finalized on 6/5/2024 9:23 AM by Dr. Francisco Llamas M.D on Workstation: [id number]         MEDICATIONS GIVEN IN ER  Medications   sodium chloride 0.9 % flush 10 mL (has no administration in time range)   sodium chloride 0.9 % bolus 1,000 mL (0 mL Intravenous Stopped 6/5/24 0950)   ondansetron (ZOFRAN) injection 4 mg (4 mg Intravenous Given 6/5/24 0718)   lactated ringers bolus 1,000 mL (0 mL Intravenous Stopped 6/5/24 1031)   iopamidol  (ISOVUE-300) 61 % injection 100 mL (85 mL Intravenous Given by Other 6/5/24 3832)         ORDERS PLACED DURING THIS VISIT:  Orders Placed This Encounter   Procedures    XR Chest 1 View    CT Abdomen Pelvis With Contrast    Comprehensive Metabolic Panel    hCG, Serum, Qualitative    Urinalysis With Microscopic If Indicated (No Culture) - Urine, Clean Catch    [place] Draw    CBC Auto Differential    Beta Hydroxybutyrate Quantitative    Blood Gas, Venous -    Basic Metabolic Panel    Blood Gas, Venous -    Urine Drug Screen - Urine, Clean Catch    [facility] (on-call MD unless specified) Details    POC Glucose Once    POC H&H    ECG 12 Lead Electrolyte Imbalance    Insert Peripheral IV    Initiate Observation Status    CBC & Differential    Ketone Bodies, Serum (Not performed at [facility])    Green Top (Gel)    Lavender Top    Light Blue Top         OUTPATIENT MEDICATION MANAGEMENT:  Current Facility-Administered Medications Ordered in Epic   Medication Dose Route Frequency Provider Last Rate Last Admin    sodium chloride 0.9 % flush 10 mL  10 mL Intravenous PRN Natasha Bradford APRN         Current Outpatient Medications Ordered in Epic   Medication Sig Dispense Refill    colesevelam (WELCHOL) 625 MG tablet Take 3 tablets by mouth.      fluconazole (DIFLUCAN) 150 MG tablet       metroNIDAZOLE (FLAGYL) 500 MG tablet       nitrofurantoin, macrocrystal-monohydrate, (MACROBID) 100 MG capsule       NovoLOG FlexPen 100 UNIT/ML solution pen-injector sc pen Inject 1 Unit for each 5 Grams to 10 Grams of carbohydrate and take 1 Unit for each 50 mg/dL blood sugar > 180 mg/dL. MDD 60      olmesartan-hydrochlorothiazide (BENICAR HCT) 20-12.5 MG per tablet Take 1 tablet by mouth Daily.      olmesartan-hydrochlorothiazide (BENICAR HCT) 40-12.5 MG per tablet       Ozempic, 1 MG/DOSE, 4 MG/3ML solution pen-injector       sulfamethoxazole-trimethoprim (BACTRIM DS,SEPTRA DS) 800-160 MG per tablet       vilazodone (VIIBRYD) 40 MG tablet  tablet Take 1 tablet by mouth Daily.           PROCEDURES  Procedures            PROGRESS, DATA ANALYSIS, CONSULTS, AND MEDICAL DECISION MAKING  All labs have been independently interpreted by me.  All radiology studies have been reviewed by me. All EKG's have been independently viewed and interpreted by me.  Discussion below represents my analysis of pertinent findings related to patient's condition, differential diagnosis, treatment plan and final disposition.    Differential diagnosis includes but is not limited to DKA, gastroenteritis, flatus, appendicitis, pancreatitis, renal colic, nephrolithiasis, mesenteric ischemia, perforated bowel, SBO, diverticulitis, colitis, abdominal wall pain, muscle spasm, IBS, biliary colic, cholecystitis  .          ED Course as of 06/05/24 1106   Wed Jun 05, 2024   0733 WBC(!): 28.37 [JS]   0749 HCG Qualitative: Negative [TD]   0814 CO2(!!): 7.6 [JS]   0838 CO2(!!): 7.6 [TD]   0839 Calcium(!!): 5.7 [TD]   0839 Anion Gap(!): 15.4 [TD]   0839 Glucose(!): 208 [TD]   0853 EKG independently interpreted by myself.  Time 8:50 AM.  Sinus rhythm.  Heart rate 97.  Left atrial normality.  No acute ST abnormality. [TD]   1002 Chest x-ray independently interpreted by myself.  No evidence of pneumonia. [TD]   1002 CO2(!): 16.0 [TD]   1002 Calcium: 8.8 [TD]   1006 I see no clear explanation for the patient's leukocytosis at this point.  Chest x-ray and CT scan are unrevealing.  I am still waiting on a urinalysis to result.  However, I do wonder if she had a case of DKA that is now resolving given the mild anion gap acidosis and if this with her vomiting is the cause of her leukocytosis.  As such, I think it is reasonable to hold off on antibiotics right now to see how her her symptoms progress and if her white count closes.  She is systemically well-appearing at this point. [TD]   1019 I discussed the case with Dr. Kam, hospitalist.  We reviewed the patient's labs, history, imaging.  At  this point, we both agree that is reasonable to hold off on antibiotics at this time.  Will continue to monitor the patient's clinical progression. [TD]      ED Course User Index  [JS] Natasha Bradford APRN  [TD] Nas Sharma II, MD             AS OF 11:06 EDT VITALS:    BP - 118/66  HR - 88  TEMP - 98.1 °F (36.7 °C)  O2 SATS - 98%    COMPLEXITY OF CARE  The patient requires admission.      DIAGNOSIS  Final diagnoses:   Nausea and vomiting, unspecified vomiting type   High anion gap metabolic acidosis   Leukocytosis, unspecified type         DISPOSITION  ED Disposition       ED Disposition   Decision to Admit    Condition   --    Comment   Level of Care: Telemetry [5]   Diagnosis: Vomiting [[id number]]   Admitting Physician: FLACO WILSON [[id number]]   Attending Physician: FLACO WILSON [[id number]]   Bed Request Comments: 5 [address]                  Please note that portions of this document were completed with a voice recognition program.    Note Disclaimer: At [facility], we believe that sharing information builds trust and better relationships. You are receiving this note because you recently visited [facility]. It is possible you will see health information before a provider has talked with you about it. This kind of information can be easy to misunderstand. To help you fully understand what it means for your health, we urge you to discuss this note with your provider.         Natasha Bradford APRN  06/05/24 1106

## 2024-06-05 NOTE — ED NOTES
Nursing report ED to floor  Elena GRANDE  49 y.o.  female    HPI :  HPI (Adult)  Stated Reason for Visit: dark brown vomit over the last 2 days, has not been able to keep anything down.  History Obtained From: patient    Chief Complaint  Chief Complaint   Patient presents with    Vomiting       Admitting doctor:   Campbell Kam MD    Admitting diagnosis:   The primary encounter diagnosis was Nausea and vomiting, unspecified vomiting type. Diagnoses of High anion gap metabolic acidosis and Leukocytosis, unspecified type were also pertinent to this visit.    Code status:   Current Code Status       Date Active Code Status Order ID Comments User Context       Not on file            Allergies:   Patient has no known allergies.    Isolation:   No active isolations    Intake and Output    Intake/Output Summary (Last 24 hours) at 6/5/2024 1039  Last data filed at 6/5/2024 1031  Gross per 24 hour   Intake 2000 ml   Output --   Net 2000 ml       Weight:       06/05/24  0649   Weight: 63.5 kg (140 lb)       Most recent vitals:   Vitals:    06/05/24 0652 06/05/24 0817 06/05/24 0959 06/05/24 1000   BP: 121/68 111/70  118/66   Pulse:  107 92 88   Resp:  18  20   Temp:       SpO2:  99%  98%   Weight:       Height:           Active LDAs/IV Access:   Lines, Drains & Airways       Active LDAs       Name Placement date Placement time Site Days    Peripheral IV 06/05/24 0710 Right Antecubital 06/05/24  0710  Antecubital  less than 1                    Labs (abnormal labs have a star):   Labs Reviewed   COMPREHENSIVE METABOLIC PANEL - Abnormal; Notable for the following components:       Result Value    Glucose 208 (*)     Creatinine 0.51 (*)     Potassium 3.1 (*)     Chloride 121 (*)     CO2 7.6 (*)     Calcium 5.7 (*)     Total Protein 4.9 (*)     Albumin 2.9 (*)     Anion Gap 15.4 (*)     All other components within normal limits    Narrative:     GFR Normal >60  Chronic Kidney Disease <60  Kidney Failure <15     CBC WITH  AUTO DIFFERENTIAL - Abnormal; Notable for the following components:    WBC 28.37 (*)     MCV 98.1 (*)     Neutrophil % 95.1 (*)     Lymphocyte % 2.1 (*)     Monocyte % 1.7 (*)     Eosinophil % 0.0 (*)     Immature Grans % 0.9 (*)     Neutrophils, Absolute 26.96 (*)     Lymphocytes, Absolute 0.60 (*)     Immature Grans, Absolute 0.26 (*)     All other components within normal limits   BETA HYDROXYBUTYRATE QUANTITATIVE - Abnormal; Notable for the following components:    Beta-Hydroxybutyrate Quant 3.733 (*)     All other components within normal limits    Narrative:     In the assessment of possible diabetic ketoacidosis, the test should be interpreted along with other clinical and laboratory findings.  A level greater than 1 mmol/L should require further evaluation and levels of more than 3 mmol/L require immediate medical review.   BASIC METABOLIC PANEL - Abnormal; Notable for the following components:    Glucose 152 (*)     CO2 16.0 (*)     Anion Gap 16.0 (*)     All other components within normal limits    Narrative:     GFR Normal >60  Chronic Kidney Disease <60  Kidney Failure <15     BLOOD GAS, VENOUS - Abnormal; Notable for the following components:    pH, Venous 7.211 (*)     pCO2, Venous 32.6 (*)     HCO3, Venous 13.1 (*)     Base Excess, Venous -13.6 (*)     CO2 Content 14.1 (*)     All other components within normal limits   HCG, SERUM, QUALITATIVE - Normal   HGB & HCT POC - Normal   RAINBOW DRAW    Narrative:     The following orders were created for panel order [place] Draw.  Procedure                               Abnormality         Status                     ---------                               -----------         ------                     Green Top (Gel)[[id number]]                                  Final result               Lavender Top[[id number]]                                     Final result               Gold Top - [facility][[id number]]                                                               Light Blue Top[[id number]]                                   Final result                 Please view results for these tests on the individual orders.   BLOOD GAS, VENOUS   URINALYSIS W/ MICROSCOPIC IF INDICATED (NO CULTURE)   URINE DRUG SCREEN   POCT GLUCOSE FINGERSTICK   CBC AND DIFFERENTIAL    Narrative:     The following orders were created for panel order CBC & Differential.  Procedure                               Abnormality         Status                     ---------                               -----------         ------                     CBC Auto Differential[[id number]]        Abnormal            Final result                 Please view results for these tests on the individual orders.   KETONE BODIES SERUM    Narrative:     The following orders were created for panel order Ketone Bodies, Serum (Not performed at [place]).  Procedure                               Abnormality         Status                     ---------                               -----------         ------                     Beta Hydroxybutyrate Isidro...[[id number]]  Abnormal            Final result                 Please view results for these tests on the individual orders.   GREEN TOP   LAVENDER TOP   LIGHT BLUE TOP       EKG:   ECG 12 Lead Electrolyte Imbalance   Preliminary Result   HEART RATE= 97  bpm   RR Interval= 619  ms   WI Interval= 149  ms   P Horizontal Axis= -27  deg   P Front Axis= 74  deg   QRSD Interval= 88  ms   QT Interval= 349  ms   QTcB= 444  ms   QRS Axis= 72  deg   T Wave Axis= 56  deg   - BORDERLINE ECG -   Sinus rhythm   Probable left atrial enlargement   Borderline low voltage, extremity leads   Electronically Signed By:    Date and Time of Study: 2024-06-05 08:50:50          Meds given in ED:   Medications   sodium chloride 0.9 % flush 10 mL (has no administration in time range)   sodium chloride 0.9 % bolus 1,000 mL (0 mL Intravenous Stopped 6/5/24 0950)   ondansetron (ZOFRAN) injection 4 mg (4 mg  Intravenous Given 6/5/24 0718)   lactated ringers bolus 1,000 mL (0 mL Intravenous Stopped 6/5/24 1031)   iopamidol (ISOVUE-300) 61 % injection 100 mL (85 mL Intravenous Given by Other 6/5/24 6924)       Imaging results:  CT Abdomen Pelvis With Contrast    Result Date: 6/5/2024  1. Mild generalized stranding within the mesentery is not specific. No evidence to suggest appendicitis or bowel obstruction. No abscess formation. Recommend follow-up CT if patient's symptoms persist or worsen. 2. 3.5 cm right adnexal cyst just lateral to the urinary bladder dome. 3. IUD is present. Radiation dose reduction techniques were utilized, including automated exposure control and exposure modulation based on body size.       XR Chest 1 View    Result Date: 6/5/2024  No focal pulmonary consolidation. Follow-up as clinical indications persist.  This report was finalized on 6/5/2024 9:23 AM by Dr. Francisco Llamas M.D on Workstation: Contactually       Ambulatory status:   - ad kaden    Social issues:   Social History     Socioeconomic History    Marital status:        Peripheral Neurovascular  Peripheral Neurovascular (Adult)  Peripheral Neurovascular WDL: WDL    Neuro Cognitive  Neuro Cognitive (Adult)  Cognitive/Neuro/Behavioral WDL: WDL    Learning  Learning Assessment (Adult)  Learning Readiness and Ability: no barriers identified    Respiratory  Respiratory (Adult)  Airway WDL: WDL  Respiratory WDL  Respiratory WDL: WDL    Abdominal Pain       Pain Assessments  Pain (Adult)  (0-10) Pain Rating: Rest: 0  (0-10) Pain Rating: Activity: 0    NIH Stroke Scale       Risa Killian RN  06/05/24 10:39 EDT

## 2024-06-05 NOTE — PLAN OF CARE
Goal Outcome Evaluation:      Patient transferred to CICU for DKA protocol. Insulin infusion started and adjusted per protocol. VSS. Continuing to monitor.

## 2024-06-05 NOTE — CONSULTS
CONSULT NOTE    Patient Identification:  Elena GRANDE  49 y.o.  female  1975  [id number]            Requesting physician: Hospitalist    Reason for Consultation: ICU comanagement/DKA    CC:     History of Present Illness:  Very pleasant 49-year-old female with type 1 diabetes presented to the hospital with intractable vomiting nausea nausea and vomiting for the last couple of days.  Denies any abdominal pain no fever chills were reported.  She has been taking her Levemir and correctional insulin based on her carb intake.  Her blood glucose was high at home but improved when admitted in the hospital.  I was called by hospitalist due to worsening acidosis.  Patient actually tells me that she feels better.  Denies any nausea vomiting abdominal pain at this time.  She tells me she has never had DKA in the past      Review of Systems  As above rest is negative  Past Medical History:  Past Medical History:   Diagnosis Date    Diabetes mellitus        Past Surgical History:  History reviewed. No pertinent surgical history.     Home Meds:  Medications Prior to Admission   Medication Sig Dispense Refill Last Dose    cetirizine (zyrTEC) 10 MG tablet Take 1 tablet by mouth Every Night.   Past Month    colesevelam (WELCHOL) 625 MG tablet Take 3 tablets by mouth.   Past Week    insulin detemir (LEVEMIR) 100 UNIT/ML injection Inject 14 Units under the skin into the appropriate area as directed Every Evening.   Past Month    montelukast (SINGULAIR) 10 MG tablet Take 1 tablet by mouth Every Night.   Past Month    olmesartan-hydrochlorothiazide (BENICAR HCT) 20-12.5 MG per tablet Take 1 tablet by mouth Daily.   6/4/2024    Ozempic, 1 MG/DOSE, 4 MG/3ML solution pen-injector    Past Week    NovoLOG FlexPen 100 UNIT/ML solution pen-injector sc pen Inject 1 Unit for each 5 Grams to 10 Grams of carbohydrate and take 1 Unit for each 50 mg/dL blood sugar > 180 mg/dL. MDD 60   More than a month       Allergies:  No Known  "Allergies    Social History:   Social History     Socioeconomic History    Marital status:    Tobacco Use    Smoking status: Never    Smokeless tobacco: Never   Vaping Use    Vaping status: Never Used   Substance and Sexual Activity    Drug use: Never    Sexual activity: Never       Family History:  History reviewed. No pertinent family history.    Physical Exam:  /62 (BP Location: Right arm, Patient Position: Lying)   Pulse 104   Temp 99.7 °F (37.6 °C) (Oral)   Resp 18   Ht 162.6 cm (64\")   Wt 63.5 kg (140 lb)   LMP 05/15/2024 (Exact Date)   SpO2 98%   BMI 24.03 kg/m²  Body mass index is 24.03 kg/m². 98% 63.5 kg (140 lb)  Physical Exam  Patient is examined using the personal protective equipment as per guidelines from infection control for this particular patient as enacted.  Hand hygiene was performed before and after patient interaction.  Well-developed normal body habitus  Eyes normal conjunctivae and pupils reactive to light  ENT Mallampati between 3 and 4 normal nasal exam  Neck midline trachea no thyromegaly  Chest no labored breathing clear  CVS regular rate and rhythm no lower extremity edema  Abdomen soft nontender no hepatosplenomegaly  CNS intact normal sensory exam  Skin no rashes no nodules  Psych oriented to time place and person normal memory  Musculoskeletal no cyanosis no clubbing normal range of motion        LABS:  Lab Results   Component Value Date    CALCIUM 8.2 (L) 06/05/2024     Results from last 7 days   Lab Units 06/05/24  1407 06/05/24  0925 06/05/24  0855 06/05/24  0717   MAGNESIUM mg/dL  --  2.2  --   --    SODIUM mmol/L 137 138  --  144   POTASSIUM mmol/L 4.1 4.6  --  3.1*   CHLORIDE mmol/L 107 106  --  121*   CO2 mmol/L 12.1* 16.0*  --  7.6*   BUN mg/dL 8 11  --  8   CREATININE mg/dL 0.75 0.91  --  0.51*   GLUCOSE mg/dL 188* 152*  --  208*   CALCIUM mg/dL 8.2* 8.8  --  5.7*   WBC 10*3/mm3  --   --   --  28.37*   HEMOGLOBIN g/dL  --   --   --  14.5   HEMOGLOBIN, " "POC g/dL  --   --  15.5  --    PLATELETS 10*3/mm3  --   --   --  355   ALT (SGPT) U/L  --   --   --  20   AST (SGOT) U/L  --   --   --  21     No results found for: \"CKTOTAL\", \"CKMB\", \"CKMBINDEX\", \"TROPONINI\", \"TROPONINT\"              Results from last 7 days   Lab Units 06/05/24  0855   MODALITY  Room Air                 Lab Results   Component Value Date    TSH 1.06 06/19/2023     Estimated Creatinine Clearance: 92 mL/min (by C-G formula based on SCr of 0.75 mg/dL).  Results from last 7 days   Lab Units 06/05/24  1035   NITRITE UA  Negative        Imaging: I personally visualized the images of scans/x-rays performed within last 3 days.      Assessment:  Acute DKA  Nausea vomiting  Metabolic acidosis  Type 1 diabetes mellitus      Recommendations:  At this time we have a young female anion gap metabolic acidosis likely from DKA.  Her anion gap has worsened and beta hydroxy butyrate acid is significantly elevated.  I will transfer the patient to the ICU and initiate insulin drip and manage DKA per Glucomander protocol  Fluid and electrolyte per DKA/Glucomander protocol  ICU core measures  No evidence to suggest infection  Monitor clinical course closely.          Tommy Diamond MD  6/5/2024  15:38 EDT      Much of this encounter note is an electronic transcription/translation of spoken language to printed text using Dragon Software.  "

## 2024-06-05 NOTE — NURSING NOTE
"Diabetes Education  Assessment/Teaching    Patient Name:  Elena GRANDE  YOB: 1975  MRN: [id number]  Admit Date:  6/5/2024      Assessment Date:  6/5/2024  Flowsheet Row Most Recent Value   General Information     Referral From: MD order- thru dka order set. Meet with pt at bedside on 5S to assess needs for ed.    Height 162.6 cm (64\")   Height Method Stated   Weight 63.5 kg (140 lb)   Diabetes History    What type of diabetes do you have? Type 1   Length of Diabetes Diagnosis -- 39 yr hx T1 per pt.   Current DM knowledge good  -pt denies having any other episode of dka in the past.   Have you had low blood sugar? (<70mg/dl) yes  -pt reports hx of regular BG readings in the 60's in the classroom setting when pt used a CGM.   Do you have any diabetes complications? -- -hospital admission for dka.   Education Preferences    Barriers to Learning --no learning barriers evident at this time.   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    Assessment Topics    Taking Medication - Assessment Competent   Problem Solving - Assessment Needs education -dz process.   Healthy Coping - Assessment Competent   Monitoring - Assessment Competent   DM Goals    Contact Plan Follow-up medical care with PCP, jaylon.             Flowsheet Row Most Recent Value   DM Education Needs    Meter Has own. D/w pt the potential benefit of resuming CGM use as an outpt. Pt plans to d/w endo.    Problem Solving Hyperglycemia, hydration status   Healthy Coping Appropriate   Discharge Plan Home, Follow-up with MD   Motivation Engaged   Teaching Method Discussion, Explanation   Patient Response Verbalized understanding        Other Comments:    Electronically signed by:  Harini Potter, RN, BSN, [facility]  06/05/24 16:56 EDT  "

## 2024-06-05 NOTE — H&P
Patient Name:  Elena GRANDE  YOB: 1975  MRN:  [id number]  Admit Date:  6/5/2024  Patient Care Team:  Beatrice Valencia APRN as PCP - General (Family Medicine)      Subjective   History Present Illness     Chief Complaint   Patient presents with    Vomiting       Ms. GRANDE is a 49 y.o. female with past history of type 1 diabetes who presented to the hospital complaining of intractable nausea and vomiting for a couple of days.  She has not had any significant abdominal pain.  She denies any fevers or chills.  She reports no ill contacts but over the last 2 days has basically been unable to keep anything down.  Thankfully she has still been taking her Levemir as well as correctional insulin based on carb intake.  Her glucose was apparently 415 at home but we have not had any readings higher than 208 here.  She received Zofran in the emergency room with resolution of symptoms as far as vomiting goes although she still became nauseated when she tried to do some clear liquids for lunch.    Review of Systems   Constitutional:  Negative for chills, fatigue and fever.   HENT:  Negative for congestion and trouble swallowing.    Eyes:  Negative for visual disturbance.   Respiratory:  Negative for cough, chest tightness and shortness of breath.    Cardiovascular:  Negative for chest pain, palpitations and leg swelling.   Gastrointestinal:  Positive for nausea and vomiting. Negative for abdominal distention, abdominal pain, constipation and diarrhea.   Genitourinary:  Negative for difficulty urinating, dysuria and frequency.   Musculoskeletal:  Negative for arthralgias.   Skin:  Negative for rash.   Neurological:  Negative for dizziness and headaches.   Psychiatric/Behavioral:  Negative for confusion.         Personal History     Past Medical History:   Diagnosis Date    Diabetes mellitus      History reviewed. No pertinent surgical history.  History reviewed. No pertinent family history.  Social History      Tobacco Use    Smoking status: Never    Smokeless tobacco: Never   Vaping Use    Vaping status: Never Used   Substance Use Topics    Drug use: Never     No current facility-administered medications on file prior to encounter.     Current Outpatient Medications on File Prior to Encounter   Medication Sig Dispense Refill    cetirizine (zyrTEC) 10 MG tablet Take 1 tablet by mouth Every Night.      colesevelam (WELCHOL) 625 MG tablet Take 3 tablets by mouth.      insulin detemir (LEVEMIR) 100 UNIT/ML injection Inject 14 Units under the skin into the appropriate area as directed Every Evening.      montelukast (SINGULAIR) 10 MG tablet Take 1 tablet by mouth Every Night.      olmesartan-hydrochlorothiazide (BENICAR HCT) 20-12.5 MG per tablet Take 1 tablet by mouth Daily.      Ozempic, 1 MG/DOSE, 4 MG/3ML solution pen-injector       [DISCONTINUED] fluconazole (DIFLUCAN) 150 MG tablet       [DISCONTINUED] metroNIDAZOLE (FLAGYL) 500 MG tablet       [DISCONTINUED] nitrofurantoin, macrocrystal-monohydrate, (MACROBID) 100 MG capsule       [DISCONTINUED] olmesartan-hydrochlorothiazide (BENICAR HCT) 40-12.5 MG per tablet       [DISCONTINUED] sulfamethoxazole-trimethoprim (BACTRIM DS,SEPTRA DS) 800-160 MG per tablet       [DISCONTINUED] vilazodone (VIIBRYD) 40 MG tablet tablet Take 1 tablet by mouth Daily.      NovoLOG FlexPen 100 UNIT/ML solution pen-injector sc pen Inject 1 Unit for each 5 Grams to 10 Grams of carbohydrate and take 1 Unit for each 50 mg/dL blood sugar > 180 mg/dL. MDD 60       No Known Allergies    Objective    Objective     Vital Signs  Temp:  [98.1 °F (36.7 °C)-99.7 °F (37.6 °C)] 99.7 °F (37.6 °C)  Heart Rate:  [] 104  Resp:  [16-20] 18  BP: (101-121)/(62-70) 101/62  SpO2:  [98 %-99 %] 98 %  on   ;   Device (Oxygen Therapy): room air  Body mass index is 24.03 kg/m².    Physical Exam  Vitals reviewed.   Constitutional:       General: She is not in acute distress.     Appearance: She is well-developed.    HENT:      Head: Normocephalic and atraumatic.      Mouth/Throat:      Pharynx: No oropharyngeal exudate.   Eyes:      General: No scleral icterus.  Neck:      Vascular: No JVD.   Cardiovascular:      Rate and Rhythm: Normal rate and regular rhythm.      Heart sounds: Normal heart sounds. No murmur heard.  Pulmonary:      Effort: Pulmonary effort is normal. No respiratory distress.      Breath sounds: Normal breath sounds.   Abdominal:      General: There is no distension.      Palpations: Abdomen is soft.      Tenderness: There is no abdominal tenderness.   Musculoskeletal:      Cervical back: Neck supple.   Skin:     General: Skin is warm and dry.      Coloration: Skin is not pale.   Neurological:      Mental Status: She is alert and oriented to person, place, and time.         Results Review:  I reviewed the patient's new clinical results.  I reviewed the patient's new imaging results and agree with the interpretation.  I reviewed the patient's other test results and agree with the interpretation  I personally viewed and interpreted the patient's EKG/Telemetry data  Discussed with ED provider.    Lab Results (last 24 hours)       Procedure Component Value Units Date/Time    CBC & Differential [[id number]]  (Abnormal) Collected: 06/05/24 0717    Specimen: Blood Updated: 06/05/24 0731    Narrative:      The following orders were created for panel order CBC & Differential.  Procedure                               Abnormality         Status                     ---------                               -----------         ------                     CBC Auto Differential[[id number]]        Abnormal            Final result                 Please view results for these tests on the individual orders.    Comprehensive Metabolic Panel [[id number]]  (Abnormal) Collected: 06/05/24 0717    Specimen: Blood Updated: 06/05/24 0812     Glucose 208 mg/dL      BUN 8 mg/dL      Creatinine 0.51 mg/dL      Sodium 144 mmol/L       Potassium 3.1 mmol/L      Comment: Slight hemolysis detected by analyzer. Result may be falsely elevated.        Chloride 121 mmol/L      CO2 7.6 mmol/L      Calcium 5.7 mg/dL      Total Protein 4.9 g/dL      Albumin 2.9 g/dL      ALT (SGPT) 20 U/L      AST (SGOT) 21 U/L      Comment: Slight hemolysis detected by analyzer. Result may be falsely elevated.        Alkaline Phosphatase 57 U/L      Total Bilirubin 0.3 mg/dL      Globulin 2.0 gm/dL      A/G Ratio 1.5 g/dL      BUN/Creatinine Ratio 15.7     Anion Gap 15.4 mmol/L      eGFR 115.3 mL/min/1.73     Narrative:      GFR Normal >60  Chronic Kidney Disease <60  Kidney Failure <15      Ketone Bodies, Serum (Not performed at [facility]) [[id number]]  (Abnormal) Collected: 06/05/24 0717    Specimen: Blood Updated: 06/05/24 1035    Narrative:      The following orders were created for panel order Ketone Bodies, Serum (Not performed at [facility]).  Procedure                               Abnormality         Status                     ---------                               -----------         ------                     Beta Hydroxybutyrate Isidro...[[id number]]  Abnormal            Final result                 Please view results for these tests on the individual orders.    hCG, Serum, Qualitative [[id number]]  (Normal) Collected: 06/05/24 0717    Specimen: Blood Updated: 06/05/24 0743     HCG Qualitative Negative    CBC Auto Differential [[id number]]  (Abnormal) Collected: 06/05/24 0717    Specimen: Blood Updated: 06/05/24 0731     WBC 28.37 10*3/mm3      RBC 4.64 10*6/mm3      Hemoglobin 14.5 g/dL      Hematocrit 45.5 %      MCV 98.1 fL      MCH 31.3 pg      MCHC 31.9 g/dL      RDW 12.6 %      RDW-SD 45.5 fl      MPV 9.3 fL      Platelets 355 10*3/mm3      Neutrophil % 95.1 %      Lymphocyte % 2.1 %      Monocyte % 1.7 %      Eosinophil % 0.0 %      Basophil % 0.2 %      Immature Grans % 0.9 %      Neutrophils, Absolute 26.96 10*3/mm3      Lymphocytes, Absolute 0.60 10*3/mm3       Monocytes, Absolute 0.48 10*3/mm3      Eosinophils, Absolute 0.00 10*3/mm3      Basophils, Absolute 0.07 10*3/mm3      Immature Grans, Absolute 0.26 10*3/mm3      nRBC 0.0 /100 WBC     Beta Hydroxybutyrate Quantitative [[id number]]  (Abnormal) Collected: 06/05/24 0717    Specimen: Blood Updated: 06/05/24 1035     Beta-Hydroxybutyrate Quant 3.733 mmol/L     Narrative:      In the assessment of possible diabetic ketoacidosis, the test should be interpreted along with other clinical and laboratory findings.  A level greater than 1 mmol/L should require further evaluation and levels of more than 3 mmol/L require immediate medical review.    POC H&H [[id number]]  (Normal) Collected: 06/05/24 0855    Specimen: Venous Blood Updated: 06/05/24 0859     Hemoglobin 15.5 g/dL      Comment: Serial Number: [id number]:  [id number]        Hematocrit 45 %     Blood Gas, Venous - [[id number]]  (Abnormal) Collected: 06/05/24 0855    Specimen: Venous Blood Updated: 06/05/24 0859     pH, Venous 7.211 pH Units      pCO2, Venous 32.6 mm Hg      pO2, Venous 36.5 mm Hg      HCO3, Venous 13.1 mmol/L      Base Excess, Venous -13.6 mmol/L      Comment: Serial Number: [id number]:  [id number]        O2 Saturation, Venous 58.9 %      CO2 Content 14.1 mmol/L      Barometric Pressure for Blood Gas 743.0000 mmHg      Modality Room Air     Notified Who md [email or web address]     Device Comment drawn by [id number] at 08:43    Basic Metabolic Panel [[id number]]  (Abnormal) Collected: 06/05/24 0925    Specimen: Blood Updated: 06/05/24 0956     Glucose 152 mg/dL      BUN 11 mg/dL      Creatinine 0.91 mg/dL      Sodium 138 mmol/L      Potassium 4.6 mmol/L      Comment: Slight hemolysis detected by analyzer. Result may be falsely elevated.        Chloride 106 mmol/L      CO2 16.0 mmol/L      Calcium 8.8 mg/dL      BUN/Creatinine Ratio 12.1     Anion Gap 16.0 mmol/L      eGFR 78.0 mL/min/1.73     Narrative:      GFR Normal >60  Chronic Kidney Disease <60  Kidney Failure <15       Magnesium [[id number]]  (Normal) Collected: 06/05/24 0925    Specimen: Blood Updated: 06/05/24 1141     Magnesium 2.2 mg/dL     Urinalysis With Microscopic If Indicated (No Culture) - Urine, Clean Catch [[id number]]  (Abnormal) Collected: 06/05/24 1035    Specimen: Urine, Clean Catch Updated: 06/05/24 1051     Color, UA Yellow     Appearance, UA Clear     pH, UA <=5.0     Specific Gravity, UA >1.030     Glucose, UA >=1000 mg/dL (3+)     Ketones, UA >=160 mg/dL (4+)     Bilirubin, UA Negative     Blood, UA Negative     Protein, UA Trace     Leuk Esterase, UA Negative     Nitrite, UA Negative     Urobilinogen, UA 0.2 E.U./dL    Narrative:      Urine microscopic not indicated.    Urine Drug Screen - Urine, Clean Catch [[id number]]  (Normal) Collected: 06/05/24 1035    Specimen: Urine, Clean Catch Updated: 06/05/24 1110     Amphet/Methamphet, Screen Negative     Barbiturates Screen, Urine Negative     Benzodiazepine Screen, Urine Negative     Cocaine Screen, Urine Negative     Opiate Screen Negative     THC, Screen, Urine Negative     Methadone Screen, Urine Negative     Oxycodone Screen, Urine Negative     Fentanyl, Urine Negative    Narrative:      Negative Thresholds Per Drugs Screened:    Amphetamines                 500 ng/ml  Barbiturates                 200 ng/ml  Benzodiazepines              100 ng/ml  Cocaine                      300 ng/ml  Methadone                    300 ng/ml  Opiates                      300 ng/ml  Oxycodone                    100 ng/ml  THC                           50 ng/ml  Fentanyl                       5 ng/ml      The Normal Value for all drugs tested is negative. This report includes final unconfirmed screening results to be used for medical treatment purposes only. Unconfirmed results must not be used for non-medical purposes such as employment or legal testing. Clinical consideration should be applied to any drug of abuse test, particularly when unconfirmed results are used.             POC Glucose Once [[id number]]  (Abnormal) Collected: 06/05/24 1224    Specimen: Blood Updated: 06/05/24 1226     Glucose 159 mg/dL     Basic Metabolic Panel [[id number]] Collected: 06/05/24 1407    Specimen: Blood Updated: 06/05/24 1435            Imaging Results (Last 24 Hours)       Procedure Component Value Units Date/Time    CT Abdomen Pelvis With Contrast [[id number]] Collected: 06/05/24 0934     Updated: 06/05/24 1054    Narrative:      CT ABDOMEN PELVIS W CONTRAST-     HISTORY: 49 years of age, Female.  vomiting, unexplained leukocytosis;  E87.20-Acidosis, unspecified; E83.51-Hypocalcemia; R11.2-Nausea with  vomiting, unspecified     TECHNIQUE:  CT includes axial imaging from the lung bases to the  trochanters with intravenous contrast and without use of oral contrast.  Data reconstructed in coronal and sagittal planes. Radiation dose  reduction techniques were utilized, including automated exposure control  and exposure modulation based on body size.     COMPARISON: None.     FINDINGS: Lung bases appear clear and there is no basilar effusion.     Liver, spleen, adrenal glands, pancreas, kidneys appear within normal  limits. No hydronephrosis. Mild urinary bladder distention without wall  thickening.     There is no bowel dilatation or evidence for bowel obstruction. There is  mild stranding within the mesentery particularly within the right  central and lateral abdomen though there is no evidence for abscess  formation. There is a paucity of intra-abdominal fat limiting evaluation  for focal inflammatory process. No evidence to suggest appendicitis.  There is a cyst along the right lateral margin of the dome of the  urinary bladder. This measures approximately 3.5 x 2.1 x 2.2 cm and is  consistent with an ovarian cyst. IUD is present. Mild atherosclerotic  calcifications are present valve in the abdominal aorta. No radhika  enlargement is present within the abdomen or pelvis.       Impression:      1. Mild  generalized stranding within the mesentery is not specific. No  evidence to suggest appendicitis or bowel obstruction. No abscess  formation. Recommend follow-up CT if patient's symptoms persist or  worsen.  2. 3.5 cm right adnexal cyst just lateral to the urinary bladder dome.  3. IUD is present.        Radiation dose reduction techniques were utilized, including automated  exposure control and exposure modulation based on body size.        This report was finalized on 6/5/2024 10:50 AM by Dr. Ja Saenz M.D on Workstation: [id number]       XR Chest 1 View [[id number]] Collected: 06/05/24 0915     Updated: 06/05/24 0926    Narrative:      XR CHEST 1 VW-     HISTORY: Female who is 49 years-old, vomiting     TECHNIQUE: Frontal views of the chest     COMPARISON: None available     FINDINGS: Heart, mediastinum and pulmonary vasculature are unremarkable.  No focal pulmonary consolidation, pleural effusion, or pneumothorax. No  acute osseous process.       Impression:      No focal pulmonary consolidation. Follow-up as clinical  indications persist.     This report was finalized on 6/5/2024 9:23 AM by Dr. Francisco Llamas M.D on Workstation: [id number]                   ECG 12 Lead Electrolyte Imbalance   Preliminary Result   HEART RATE= 97  bpm   RR Interval= 619  ms   OR Interval= 149  ms   P Horizontal Axis= -27  deg   P Front Axis= 74  deg   QRSD Interval= 88  ms   QT Interval= 349  ms   QTcB= 444  ms   QRS Axis= 72  deg   T Wave Axis= 56  deg   - BORDERLINE ECG -   Sinus rhythm   Probable left atrial enlargement   Borderline low voltage, extremity leads   Electronically Signed By:    Date and Time of Study: 2024-06-05 08:50:50           Assessment/Plan     Active Hospital Problems    Diagnosis  POA    **Vomiting [R11.10]  Yes    Type 1 diabetes mellitus [E10.9]  Yes    Leukocytosis [D72.829]  Yes      Resolved Hospital Problems   No resolved problems to display.       Ms. GRANDE is a 49 y.o. female with history  of type 1 diabetes admitted with intractable vomiting and leukocytosis    Patient had severe vomiting but has responded well to Zofran.  Labs were consistent with perhaps early DKA with some slight acidosis on VBG as well as elevated beta hydroxybutyrate.  Thankfully patient has been able to keep her insulin going which has probably staved off full-blown DKA at this point  Plan to continue aggressive IV fluids.  Rechecking labs this afternoon and if bicarb is worsening would consider initiating insulin drip per DKA protocol at that point.  Will plan to continue Levemir at this point as long as we are not having to go to insulin drip.  Start Pepcid IV  Zofran available as needed    VTE Prophylaxis - SCDs.  Code Status - Full code.       Campbell Kam MD  [facility]  06/05/24  14:58 EDT

## 2024-06-06 VITALS
RESPIRATION RATE: 16 BRPM | HEART RATE: 100 BPM | OXYGEN SATURATION: 100 % | WEIGHT: 143.3 LBS | BODY MASS INDEX: 24.46 KG/M2 | HEIGHT: 64 IN | TEMPERATURE: 98.2 F | DIASTOLIC BLOOD PRESSURE: 74 MMHG | SYSTOLIC BLOOD PRESSURE: 115 MMHG

## 2024-06-06 PROBLEM — R11.10 VOMITING: Status: RESOLVED | Noted: 2024-06-05 | Resolved: 2024-06-06

## 2024-06-06 PROBLEM — E10.10 TYPE 1 DIABETES MELLITUS WITH KETOACIDOSIS WITHOUT COMA: Status: ACTIVE | Noted: 2024-06-05

## 2024-06-06 LAB
ANION GAP SERPL CALCULATED.3IONS-SCNC: 11 MMOL/L (ref 5–15)
ANION GAP SERPL CALCULATED.3IONS-SCNC: 8.1 MMOL/L (ref 5–15)
ANION GAP SERPL CALCULATED.3IONS-SCNC: 8.6 MMOL/L (ref 5–15)
BUN SERPL-MCNC: 4 MG/DL (ref 6–20)
BUN SERPL-MCNC: 4 MG/DL (ref 6–20)
BUN SERPL-MCNC: 5 MG/DL (ref 6–20)
BUN/CREAT SERPL: 6 (ref 7–25)
BUN/CREAT SERPL: 6.2 (ref 7–25)
BUN/CREAT SERPL: 6.8 (ref 7–25)
CALCIUM SPEC-SCNC: 7.4 MG/DL (ref 8.6–10.5)
CALCIUM SPEC-SCNC: 7.5 MG/DL (ref 8.6–10.5)
CALCIUM SPEC-SCNC: 7.8 MG/DL (ref 8.6–10.5)
CHLORIDE SERPL-SCNC: 108 MMOL/L (ref 98–107)
CHLORIDE SERPL-SCNC: 110 MMOL/L (ref 98–107)
CHLORIDE SERPL-SCNC: 111 MMOL/L (ref 98–107)
CO2 SERPL-SCNC: 15.9 MMOL/L (ref 22–29)
CO2 SERPL-SCNC: 17.4 MMOL/L (ref 22–29)
CO2 SERPL-SCNC: 20 MMOL/L (ref 22–29)
CREAT SERPL-MCNC: 0.65 MG/DL (ref 0.57–1)
CREAT SERPL-MCNC: 0.67 MG/DL (ref 0.57–1)
CREAT SERPL-MCNC: 0.73 MG/DL (ref 0.57–1)
DEPRECATED RDW RBC AUTO: 44.6 FL (ref 37–54)
EGFRCR SERPLBLD CKD-EPI 2021: 101.6 ML/MIN/1.73
EGFRCR SERPLBLD CKD-EPI 2021: 108 ML/MIN/1.73
EGFRCR SERPLBLD CKD-EPI 2021: 108.8 ML/MIN/1.73
ERYTHROCYTE [DISTWIDTH] IN BLOOD BY AUTOMATED COUNT: 12.5 % (ref 12.3–15.4)
GLUCOSE BLDC GLUCOMTR-MCNC: 128 MG/DL (ref 70–130)
GLUCOSE BLDC GLUCOMTR-MCNC: 130 MG/DL (ref 70–130)
GLUCOSE BLDC GLUCOMTR-MCNC: 134 MG/DL (ref 70–130)
GLUCOSE BLDC GLUCOMTR-MCNC: 145 MG/DL (ref 70–130)
GLUCOSE BLDC GLUCOMTR-MCNC: 180 MG/DL (ref 70–130)
GLUCOSE BLDC GLUCOMTR-MCNC: 185 MG/DL (ref 70–130)
GLUCOSE BLDC GLUCOMTR-MCNC: 65 MG/DL (ref 70–130)
GLUCOSE BLDC GLUCOMTR-MCNC: 82 MG/DL (ref 70–130)
GLUCOSE BLDC GLUCOMTR-MCNC: 89 MG/DL (ref 70–130)
GLUCOSE SERPL-MCNC: 106 MG/DL (ref 65–99)
GLUCOSE SERPL-MCNC: 171 MG/DL (ref 65–99)
GLUCOSE SERPL-MCNC: 193 MG/DL (ref 65–99)
HCT VFR BLD AUTO: 35.8 % (ref 34–46.6)
HGB BLD-MCNC: 11.4 G/DL (ref 12–15.9)
MAGNESIUM SERPL-MCNC: 1.7 MG/DL (ref 1.6–2.6)
MAGNESIUM SERPL-MCNC: 1.9 MG/DL (ref 1.6–2.6)
MCH RBC QN AUTO: 30.7 PG (ref 26.6–33)
MCHC RBC AUTO-ENTMCNC: 31.8 G/DL (ref 31.5–35.7)
MCV RBC AUTO: 96.5 FL (ref 79–97)
PHOSPHATE SERPL-MCNC: 0.9 MG/DL (ref 2.5–4.5)
PHOSPHATE SERPL-MCNC: 1.8 MG/DL (ref 2.5–4.5)
PLATELET # BLD AUTO: 262 10*3/MM3 (ref 140–450)
PMV BLD AUTO: 9.3 FL (ref 6–12)
POTASSIUM SERPL-SCNC: 3.7 MMOL/L (ref 3.5–5.2)
POTASSIUM SERPL-SCNC: 4.1 MMOL/L (ref 3.5–5.2)
POTASSIUM SERPL-SCNC: 4.4 MMOL/L (ref 3.5–5.2)
RBC # BLD AUTO: 3.71 10*6/MM3 (ref 3.77–5.28)
SODIUM SERPL-SCNC: 134 MMOL/L (ref 136–145)
SODIUM SERPL-SCNC: 137 MMOL/L (ref 136–145)
SODIUM SERPL-SCNC: 139 MMOL/L (ref 136–145)
WBC NRBC COR # BLD AUTO: 11.85 10*3/MM3 (ref 3.4–10.8)

## 2024-06-06 PROCEDURE — G0378 HOSPITAL OBSERVATION PER HR: HCPCS

## 2024-06-06 PROCEDURE — 85027 COMPLETE CBC AUTOMATED: CPT | Performed by: INTERNAL MEDICINE

## 2024-06-06 PROCEDURE — 82948 REAGENT STRIP/BLOOD GLUCOSE: CPT

## 2024-06-06 PROCEDURE — 36415 COLL VENOUS BLD VENIPUNCTURE: CPT | Performed by: INTERNAL MEDICINE

## 2024-06-06 PROCEDURE — 84100 ASSAY OF PHOSPHORUS: CPT | Performed by: INTERNAL MEDICINE

## 2024-06-06 PROCEDURE — 63710000001 INSULIN GLARGINE PER 5 UNITS

## 2024-06-06 PROCEDURE — 80048 BASIC METABOLIC PNL TOTAL CA: CPT | Performed by: INTERNAL MEDICINE

## 2024-06-06 PROCEDURE — 83735 ASSAY OF MAGNESIUM: CPT | Performed by: INTERNAL MEDICINE

## 2024-06-06 PROCEDURE — 96366 THER/PROPH/DIAG IV INF ADDON: CPT

## 2024-06-06 PROCEDURE — 63710000001 INSULIN LISPRO (HUMAN) PER 5 UNITS: Performed by: INTERNAL MEDICINE

## 2024-06-06 RX ORDER — INSULIN LISPRO 100 [IU]/ML
6 INJECTION, SOLUTION INTRAVENOUS; SUBCUTANEOUS
Status: DISCONTINUED | OUTPATIENT
Start: 2024-06-06 | End: 2024-06-06

## 2024-06-06 RX ORDER — INSULIN LISPRO 100 [IU]/ML
3-14 INJECTION, SOLUTION INTRAVENOUS; SUBCUTANEOUS
Status: DISCONTINUED | OUTPATIENT
Start: 2024-06-06 | End: 2024-06-06

## 2024-06-06 RX ORDER — DEXTROSE MONOHYDRATE 25 G/50ML
10-50 INJECTION, SOLUTION INTRAVENOUS
Status: DISCONTINUED | OUTPATIENT
Start: 2024-06-06 | End: 2024-06-06 | Stop reason: HOSPADM

## 2024-06-06 RX ORDER — NICOTINE POLACRILEX 4 MG
15 LOZENGE BUCCAL
Status: DISCONTINUED | OUTPATIENT
Start: 2024-06-06 | End: 2024-06-06 | Stop reason: HOSPADM

## 2024-06-06 RX ORDER — INSULIN DEGLUDEC 100 U/ML
INJECTION, SOLUTION SUBCUTANEOUS
Qty: 15 ML | Refills: 0 | Status: CANCELLED | OUTPATIENT
Start: 2024-06-06

## 2024-06-06 RX ORDER — IBUPROFEN 600 MG/1
1 TABLET ORAL
Status: DISCONTINUED | OUTPATIENT
Start: 2024-06-06 | End: 2024-06-06

## 2024-06-06 RX ORDER — DEXTROSE MONOHYDRATE 25 G/50ML
25 INJECTION, SOLUTION INTRAVENOUS
Status: DISCONTINUED | OUTPATIENT
Start: 2024-06-06 | End: 2024-06-06

## 2024-06-06 RX ORDER — INSULIN ASPART 100 [IU]/ML
INJECTION, SOLUTION INTRAVENOUS; SUBCUTANEOUS
Qty: 15 ML | Refills: 0 | Status: CANCELLED | OUTPATIENT
Start: 2024-06-06

## 2024-06-06 RX ORDER — NICOTINE POLACRILEX 4 MG
15 LOZENGE BUCCAL
Status: DISCONTINUED | OUTPATIENT
Start: 2024-06-06 | End: 2024-06-06

## 2024-06-06 RX ORDER — INSULIN LISPRO 100 [IU]/ML
1-200 INJECTION, SOLUTION INTRAVENOUS; SUBCUTANEOUS AS NEEDED
Status: DISCONTINUED | OUTPATIENT
Start: 2024-06-06 | End: 2024-06-06 | Stop reason: HOSPADM

## 2024-06-06 RX ORDER — INSULIN LISPRO 100 [IU]/ML
1-200 INJECTION, SOLUTION INTRAVENOUS; SUBCUTANEOUS
Status: DISCONTINUED | OUTPATIENT
Start: 2024-06-06 | End: 2024-06-06 | Stop reason: HOSPADM

## 2024-06-06 RX ADMIN — INSULIN LISPRO 2 UNITS: 100 INJECTION, SOLUTION INTRAVENOUS; SUBCUTANEOUS at 13:12

## 2024-06-06 RX ADMIN — POTASSIUM PHOSPHATE, MONOBASIC AND POTASSIUM PHOSPHATE, DIBASIC 15 MMOL: 224; 236 INJECTION, SOLUTION, CONCENTRATE INTRAVENOUS at 06:15

## 2024-06-06 RX ADMIN — POTASSIUM CHLORIDE, DEXTROSE MONOHYDRATE AND SODIUM CHLORIDE 150 ML/HR: 150; 5; 450 INJECTION, SOLUTION INTRAVENOUS at 00:42

## 2024-06-06 RX ADMIN — DEXTROSE MONOHYDRATE 50 ML: 25 INJECTION, SOLUTION INTRAVENOUS at 06:33

## 2024-06-06 RX ADMIN — Medication 10 ML: at 09:31

## 2024-06-06 RX ADMIN — INSULIN GLARGINE 8 UNITS: 100 INJECTION, SOLUTION SUBCUTANEOUS at 05:30

## 2024-06-06 RX ADMIN — POTASSIUM PHOSPHATE, MONOBASIC AND POTASSIUM PHOSPHATE, DIBASIC 15 MMOL: 224; 236 INJECTION, SOLUTION, CONCENTRATE INTRAVENOUS at 03:38

## 2024-06-06 RX ADMIN — POTASSIUM PHOSPHATE, MONOBASIC AND POTASSIUM PHOSPHATE, DIBASIC 15 MMOL: 224; 236 INJECTION, SOLUTION, CONCENTRATE INTRAVENOUS at 04:47

## 2024-06-06 NOTE — DISCHARGE SUMMARY
Patient Name: Elena GRANDE  : 1975  MRN: [id number]    Date of Admission: 2024  Date of Discharge:  2024  Primary Care Physician: Beatrice Valencia APRN      Chief Complaint:   Vomiting      Discharge Diagnoses     Active Hospital Problems    Diagnosis  POA    **Type 1 diabetes mellitus with ketoacidosis without coma [E10.10]  Yes    Leukocytosis [D72.829]  Yes      Resolved Hospital Problems    Diagnosis Date Resolved POA    Vomiting [R11.10] 2024 Yes        Hospital Course     Ms. GRANDE is a 49 y.o. female with a history of type 1 diabetes who presented to [facility] initially complaining of intractable nausea and vomiting for about 2 days.  Please see the admitting history and physical for further details.  She had no recent ill contacts.  She has been treated with Levemir, NovoLog as well as Ozempic under the direction of her endocrinologist and is compliant with treatment.  Her A1c is 7 and I think she usually does an excellent job with her sugars.  She apparently had some elevation at home prior to coming in but on arrival here labs showed only a glucose of 208.  She did have a slight anion gap of 16 with bicarb of 16.  Beta hydroxybutyrate was positive.  She was treated with correctional insulin and IV fluid boluses.  She was admitted to our service to the floor but on serial rechecks of her BMP her bicarb was actually worsening with increased anion gap.  As such she was transferred to ICU and started on DKA protocol.  Her DKA corrected fairly quickly and drip was discontinued very early this morning.  Her nausea and vomiting resolved with just a couple doses of Zofran and she has been tolerating a regular diet very well.  She voices no concerns or complaints this afternoon and very much wants to go home.  She is quite intelligent about her diabetes management and capable of managing her sugars at home at this point.  I did ask her to discuss with her endocrinologist  regarding continued use of Ozempic as pharmacy has raise concerns regarding DKA.  For now discontinued on her list and told her to talk with endocrinology before restarting.  She is reluctant because she says Ozempic has been a good medicine for her but will discuss further with endocrine.        Day of Discharge     Subjective:  No new complaints    Physical Exam:  Temp:  [98.2 °F (36.8 °C)-100.4 °F (38 °C)] 98.2 °F (36.8 °C)  Heart Rate:  [] 100  Resp:  [16] 16  BP: ()/(54-74) 115/74  Body mass index is 24.6 kg/m².  Physical Exam  Vitals and nursing note reviewed.   Constitutional:       General: She is not in acute distress.  HENT:      Head: Normocephalic and atraumatic.   Eyes:      General: No scleral icterus.  Neck:      Vascular: No JVD.   Cardiovascular:      Rate and Rhythm: Normal rate and regular rhythm.      Pulses: Normal pulses.      Heart sounds: No murmur heard.  Pulmonary:      Effort: Pulmonary effort is normal. No respiratory distress.      Breath sounds: Normal breath sounds.   Abdominal:      General: There is no distension.      Palpations: Abdomen is soft.      Tenderness: There is no abdominal tenderness.   Musculoskeletal:         General: No swelling or tenderness.      Cervical back: Neck supple.   Skin:     General: Skin is warm and dry.      Coloration: Skin is not jaundiced.      Findings: No rash.   Neurological:      Mental Status: She is alert and oriented to person, place, and time.   Psychiatric:         Mood and Affect: Mood normal.         Behavior: Behavior normal.         Consultants     Consult Orders (all) (From admission, onward)       Start     Ordered    06/05/24 1542  Inpatient Diabetes Educator Consult  Once        Provider:  (Not yet assigned)    06/05/24 1541    06/05/24 1528  Inpatient Pulmonology Consult  Once        Specialty:  Pulmonary Disease  Provider:  Tommy Diamond MD    06/05/24 1528    06/05/24 1007  [facility] (on-call MD unless specified) Details   Once        Specialty:  Hospitalist  Provider:  Campbell Kam MD    06/05/24 1006                  Procedures       Imaging Results (All)       Procedure Component Value Units Date/Time    CT Abdomen Pelvis With Contrast [[id number]] Collected: 06/05/24 0934     Updated: 06/05/24 1054    Narrative:      CT ABDOMEN PELVIS W CONTRAST-     HISTORY: 49 years of age, Female.  vomiting, unexplained leukocytosis;  E87.20-Acidosis, unspecified; E83.51-Hypocalcemia; R11.2-Nausea with  vomiting, unspecified     TECHNIQUE:  CT includes axial imaging from the lung bases to the  trochanters with intravenous contrast and without use of oral contrast.  Data reconstructed in coronal and sagittal planes. Radiation dose  reduction techniques were utilized, including automated exposure control  and exposure modulation based on body size.     COMPARISON: None.     FINDINGS: Lung bases appear clear and there is no basilar effusion.     Liver, spleen, adrenal glands, pancreas, kidneys appear within normal  limits. No hydronephrosis. Mild urinary bladder distention without wall  thickening.     There is no bowel dilatation or evidence for bowel obstruction. There is  mild stranding within the mesentery particularly within the right  central and lateral abdomen though there is no evidence for abscess  formation. There is a paucity of intra-abdominal fat limiting evaluation  for focal inflammatory process. No evidence to suggest appendicitis.  There is a cyst along the right lateral margin of the dome of the  urinary bladder. This measures approximately 3.5 x 2.1 x 2.2 cm and is  consistent with an ovarian cyst. IUD is present. Mild atherosclerotic  calcifications are present valve in the abdominal aorta. No radhika  enlargement is present within the abdomen or pelvis.       Impression:      1. Mild generalized stranding within the mesentery is not specific. No  evidence to suggest appendicitis or bowel obstruction. No  abscess  formation. Recommend follow-up CT if patient's symptoms persist or  worsen.  2. 3.5 cm right adnexal cyst just lateral to the urinary bladder dome.  3. IUD is present.        Radiation dose reduction techniques were utilized, including automated  exposure control and exposure modulation based on body size.        This report was finalized on 6/5/2024 10:50 AM by Dr. Ja Saenz M.D on Workstation: [id number]       XR Chest 1 View [[id number]] Collected: 06/05/24 0915     Updated: 06/05/24 0926    Narrative:      XR CHEST 1 VW-     HISTORY: Female who is 49 years-old, vomiting     TECHNIQUE: Frontal views of the chest     COMPARISON: None available     FINDINGS: Heart, mediastinum and pulmonary vasculature are unremarkable.  No focal pulmonary consolidation, pleural effusion, or pneumothorax. No  acute osseous process.       Impression:      No focal pulmonary consolidation. Follow-up as clinical  indications persist.     This report was finalized on 6/5/2024 9:23 AM by Dr. Francisco Llamas M.D on Workstation: [id number]                 Pertinent Labs     Results from last 7 days   Lab Units 06/06/24  0420 06/05/24  1600 06/05/24  0855 06/05/24  0717   WBC 10*3/mm3 11.85* 24.76*  --  28.37*   HEMOGLOBIN g/dL 11.4* 13.3  --  14.5   HEMOGLOBIN, POC g/dL  --   --  15.5  --    PLATELETS 10*3/mm3 262 314  --  355     Results from last 7 days   Lab Units 06/06/24  1141 06/06/24  0420 06/06/24  0026 06/05/24  2227   SODIUM mmol/L 139 137 134* 134*   POTASSIUM mmol/L 4.4 4.1 3.7 4.1   CHLORIDE mmol/L 108* 111* 110* 109*   CO2 mmol/L 20.0* 17.4* 15.9* 11.7*   BUN mg/dL 4* 4* 5* 6   CREATININE mg/dL 0.65 0.67 0.73 0.78   GLUCOSE mg/dL 193* 106* 171* 214*   EGFR mL/min/1.73 108.8 108.0 101.6 93.8     Results from last 7 days   Lab Units 06/05/24  1407 06/05/24  0717   ALBUMIN g/dL 3.7 2.9*   BILIRUBIN mg/dL 0.4 0.3   ALK PHOS U/L 73 57   AST (SGOT) U/L 18 21   ALT (SGPT) U/L 21 20     Results from last 7 days  "  Lab Units 06/06/24  1141 06/06/24  0420 06/06/24  0026 06/05/24  2227 06/05/24  1600 06/05/24  1407 06/05/24  0925 06/05/24  0717   CALCIUM mg/dL 7.8* 7.5* 7.4* 7.4* 8.0* 8.2*  8.2*   < > 5.7*   ALBUMIN g/dL  --   --   --   --   --  3.7  --  2.9*   MAGNESIUM mg/dL  --  1.9 1.7 1.7 1.8 1.8   < >  --    PHOSPHORUS mg/dL  --  1.8* 0.9* 1.4* 2.5 2.6   < >  --     < > = values in this interval not displayed.       Results from last 7 days   Lab Units 06/05/24  1600 06/05/24  1407   HSTROP T ng/L 8 6           Invalid input(s): \"LDLCALC\"          Test Results Pending at Discharge       Discharge Details        Discharge Medications        Continue These Medications        Instructions Start Date   cetirizine 10 MG tablet  Commonly known as: zyrTEC   10 mg, Oral, Nightly      colesevelam 625 MG tablet  Commonly known as: WELCHOL   1,875 mg, Oral      insulin detemir 100 UNIT/ML injection  Commonly known as: LEVEMIR   14 Units, Subcutaneous, Every Evening      montelukast 10 MG tablet  Commonly known as: SINGULAIR   10 mg, Oral, Nightly      NovoLOG FlexPen 100 UNIT/ML solution pen-injector sc pen  Generic drug: insulin aspart   Inject 1 Unit for each 5 Grams to 10 Grams of carbohydrate and take 1 Unit for each 50 mg/dL blood sugar > 180 mg/dL. MDD 60      olmesartan-hydrochlorothiazide 20-12.5 MG per tablet  Commonly known as: BENICAR HCT   1 tablet, Oral, Daily             Stop These Medications      Ozempic (1 MG/DOSE) 4 MG/3ML solution pen-injector  Generic drug: Semaglutide (1 MG/DOSE)              No Known Allergies    Discharge Disposition:  Home or Self Care      Discharge Diet:  Diet Order   Procedures    Diet: Regular/House, Diabetic; Consistent Carbohydrate; Fluid Consistency: Thin (IDDSI 0)       Discharge Activity:       CODE STATUS:    Code Status and Medical Interventions:   Ordered at: 06/05/24 1128     Code Status (Patient has no pulse and is not breathing):    CPR (Attempt to Resuscitate)     Medical " Interventions (Patient has pulse or is breathing):    Full Support       No future appointments.  Additional Instructions for the Follow-ups that You Need to Schedule       Discharge Follow-up with PCP   As directed       Currently Documented PCP:    Beatrice Valencia APRN    PCP Phone Number:    [phone]     Follow Up Details: 1-2 weeks        Discharge Follow-up with Specialty: Endocrinology: Patient to contact them to discuss Ozempic and risk for DKA   As directed      Specialty: Endocrinology: Patient to contact them to discuss Ozempic and risk for DKA               Follow-up Information       Beatrice Valencia APRN .    Specialty: Family Medicine  Why: 1-2 weeks  Contact information:  [address] [facility] [address]  [place] [address]  [phone]                             Additional Instructions for the Follow-ups that You Need to Schedule       Discharge Follow-up with PCP   As directed       Currently Documented PCP:    Beatrice Valencia APRN    PCP Phone Number:    [phone]     Follow Up Details: 1-2 weeks        Discharge Follow-up with Specialty: Endocrinology: Patient to contact them to discuss Ozempic and risk for DKA   As directed      Specialty: Endocrinology: Patient to contact them to discuss Ozempic and risk for DKA            Time Spent on Discharge:  Greater than 30 minutes      Campbell Kam MD  [facility]  06/06/24  15:22 EDT

## 2024-06-06 NOTE — PROGRESS NOTES
"      [facility]         Dr Sanders Sayied   LOS: 0 days   Patient Care Team:  Beatrice Valencia APRN as PCP - General (Family Medicine)    Chief Complaint: Acute DKA with metabolic acidosis underlying history of type 1 diabetes    Interval History: Much better this morning.  Insulin drip ongoing.  No overnight issues reported.    REVIEW OF SYSTEMS:   CARDIOVASCULAR: No chest pain, chest pressure or chest discomfort. No palpitations or edema.   RESPIRATORY: No shortness of breath, cough or sputum.   GASTROINTESTINAL: No anorexia, nausea, vomiting or diarrhea. No abdominal pain or blood.   HEMATOLOGIC: No bleeding or bruising.     Ventilator/Non-Invasive Ventilation Settings (From admission, onward)      None              Vital Signs  Temp:  [98.2 °F (36.8 °C)-100.4 °F (38 °C)] 98.2 °F (36.8 °C)  Heart Rate:  [] 84  Resp:  [16-20] 16  BP: ()/(54-70) 103/60    Intake/Output Summary (Last 24 hours) at 6/6/2024 0810  Last data filed at 6/6/2024 0600  Gross per 24 hour   Intake 4457 ml   Output 1700 ml   Net 2757 ml     Flowsheet Rows      Flowsheet Row First Filed Value   Admission Height 162.6 cm (64\") Documented at 06/05/2024 0649   Admission Weight 63.5 kg (140 lb) Documented at 06/05/2024 0649            Physical Exam:  Patient is examined using the personal protective equipment as per guidelines from infection control for this particular patient as enacted.  Hand hygiene was performed before and after patient interaction.   General Appearance:    Alert, cooperative, in no acute distress.  Following simple commands  ENT Mallampati between 3 and 4 no nasal congestion  Neck midline trachea, no thyromegaly   Lungs:     Clear to auscultation, respirations regular, even and                  unlabored    Heart:    Regular rhythm and normal rate, normal S1 and S2, no            murmur, no gallop, no rub, no click   Chest Wall:    No abnormalities observed   Abdomen:     Normal bowel sounds, no " masses, no organomegaly, soft        nontender, nondistended, no guarding, no rebound                tenderness   Extremities:   Moves all extremities well, no edema, no cyanosis, no             redness  CNS no focal neurological deficits normal sensory exam  Skin no rashes no nodules  Musculoskeletal no cyanosis no clubbing normal range of motion     Results Review:        Results from last 7 days   Lab Units 06/06/24  0420 06/06/24  0026 06/05/24  2227   SODIUM mmol/L 137 134* 134*   POTASSIUM mmol/L 4.1 3.7 4.1   CHLORIDE mmol/L 111* 110* 109*   CO2 mmol/L 17.4* 15.9* 11.7*   BUN mg/dL 4* 5* 6   CREATININE mg/dL 0.67 0.73 0.78   GLUCOSE mg/dL 106* 171* 214*   CALCIUM mg/dL 7.5* 7.4* 7.4*     Results from last 7 days   Lab Units 06/05/24  1600 06/05/24  1407   HSTROP T ng/L 8 6     Results from last 7 days   Lab Units 06/06/24  0420 06/05/24  1600 06/05/24  0855 06/05/24  0717   WBC 10*3/mm3 11.85* 24.76*  --  28.37*   HEMOGLOBIN g/dL 11.4* 13.3  --  14.5   HEMOGLOBIN, POC g/dL  --   --  15.5  --    HEMATOCRIT % 35.8 42.0  --  45.5   HEMATOCRIT POC %  --   --  45  --    PLATELETS 10*3/mm3 262 314  --  355             Results from last 7 days   Lab Units 06/06/24  0420   MAGNESIUM mg/dL 1.9               I reviewed the patient's new clinical results.  I personally viewed and interpreted the patient's chest x-ray.        Medication Review:   cetirizine, 10 mg, Oral, Nightly  insulin glargine, 1-200 Units, Subcutaneous, Nightly - Glucommander  insulin lispro, 1-200 Units, Subcutaneous, 4x Daily With Meals & Nightly  montelukast, 10 mg, Oral, Nightly  sodium chloride, 10 mL, Intravenous, Q12H  sodium chloride, 10 mL, Intravenous, Q12H        sodium chloride, 250 mL/hr        ASSESSMENT:   Acute DKA  Nausea vomiting  Metabolic acidosis  Type 1 diabetes mellitus    PLAN:  Anion gap and DKA has resolved.  Transition to subcu insulin discontinue insulin drip  Discontinue IV fluids and electrolyte monitoring  No  concentrated sweet/diabetic diet  Mobilize ambulate  Transfer patient out of the ICU      Tommy Diamond MD  06/06/24  08:10 EDT

## 2024-06-06 NOTE — PLAN OF CARE
Goal Outcome Evaluation:              Outcome Evaluation: Patient remains in CICU. DKA resolution criteria met and transition orders placed for sub q glucommander, currently in transition process with IV gtt still infusing. Phos replaced per orders. Patient up to bedside commode with 1700 UOP. Tmax 100.4 treated with tylenol. No acute events overnight. VSS. Continue to monitor.

## 2024-06-06 NOTE — PLAN OF CARE
Goal Outcome Evaluation:  Plan of Care Reviewed With: patient        Progress: improving  Outcome Evaluation: Pt appetite fair. Blood sugars more stable. Skin intact. Pt up ad-kaden in room. Given discharge instructions and education . Discharged to home with family.

## 2024-06-07 NOTE — PROGRESS NOTES
Case Management Discharge Note      Final Note: DC'd home                    Transportation Services  Private: Car    Final Discharge Disposition Code: 01 - home or self-care